# Patient Record
Sex: FEMALE | Race: WHITE | NOT HISPANIC OR LATINO | Employment: OTHER | URBAN - METROPOLITAN AREA
[De-identification: names, ages, dates, MRNs, and addresses within clinical notes are randomized per-mention and may not be internally consistent; named-entity substitution may affect disease eponyms.]

---

## 2017-01-10 RX ORDER — SIMVASTATIN 40 MG
TABLET ORAL
Qty: 90 TABLET | Refills: 0 | Status: SHIPPED | OUTPATIENT
Start: 2017-01-10 | End: 2017-03-23 | Stop reason: SDUPTHER

## 2017-01-12 RX ORDER — DICYCLOMINE HYDROCHLORIDE 10 MG/1
CAPSULE ORAL
Qty: 120 CAPSULE | Refills: 0 | Status: SHIPPED | OUTPATIENT
Start: 2017-01-12 | End: 2017-02-18 | Stop reason: SDUPTHER

## 2017-02-03 ENCOUNTER — TELEPHONE (OUTPATIENT)
Dept: FAMILY MEDICINE CLINIC | Facility: CLINIC | Age: 67
End: 2017-02-03

## 2017-02-03 DIAGNOSIS — R92.8 ABNORMAL MAMMOGRAM OF RIGHT BREAST: Primary | ICD-10-CM

## 2017-02-03 NOTE — TELEPHONE ENCOUNTER
Liset with the [facility] [place] needs a order for a   Diagnostic Right Mammogram with U/S and biopsy if needed.  The Diagnosis is: 6 mo f/u right breast for oil cysts and masses.  This order needs to be faxed to [phone]

## 2017-02-20 RX ORDER — DICYCLOMINE HYDROCHLORIDE 10 MG/1
CAPSULE ORAL
Qty: 120 CAPSULE | Refills: 0 | Status: SHIPPED | OUTPATIENT
Start: 2017-02-20 | End: 2017-03-23 | Stop reason: SDUPTHER

## 2017-03-06 RX ORDER — VENLAFAXINE HYDROCHLORIDE 150 MG/1
CAPSULE, EXTENDED RELEASE ORAL
Qty: 90 CAPSULE | Refills: 0 | Status: SHIPPED | OUTPATIENT
Start: 2017-03-06 | End: 2017-03-23 | Stop reason: SDUPTHER

## 2017-03-07 DIAGNOSIS — E78.5 HYPERLIPIDEMIA, UNSPECIFIED HYPERLIPIDEMIA TYPE: Primary | ICD-10-CM

## 2017-03-07 DIAGNOSIS — K21.9 GASTROESOPHAGEAL REFLUX DISEASE WITHOUT ESOPHAGITIS: ICD-10-CM

## 2017-03-08 ENCOUNTER — LAB (OUTPATIENT)
Dept: FAMILY MEDICINE CLINIC | Facility: CLINIC | Age: 67
End: 2017-03-08

## 2017-03-08 DIAGNOSIS — E78.5 HYPERLIPIDEMIA, UNSPECIFIED HYPERLIPIDEMIA TYPE: ICD-10-CM

## 2017-03-08 DIAGNOSIS — K21.9 GASTROESOPHAGEAL REFLUX DISEASE WITHOUT ESOPHAGITIS: ICD-10-CM

## 2017-03-08 LAB
ALBUMIN SERPL-MCNC: 4.2 G/DL (ref 3.5–5.2)
ALBUMIN/GLOB SERPL: 1.5 G/DL
ALP SERPL-CCNC: 99 U/L (ref 40–129)
ALT SERPL-CCNC: 39 U/L (ref 5–33)
AST SERPL-CCNC: 33 U/L (ref 5–32)
BASOPHILS # BLD AUTO: 0.04 10*3/MM3 (ref 0–0.2)
BASOPHILS NFR BLD AUTO: 0.6 % (ref 0–2)
BILIRUB SERPL-MCNC: 0.4 MG/DL (ref 0.2–1.2)
BUN SERPL-MCNC: 12 MG/DL (ref 8–23)
BUN/CREAT SERPL: 14.3 (ref 7–25)
CALCIUM SERPL-MCNC: 9.4 MG/DL (ref 8.8–10.5)
CHLORIDE SERPL-SCNC: 99 MMOL/L (ref 98–107)
CHOLEST SERPL-MCNC: 194 MG/DL (ref 0–200)
CO2 SERPL-SCNC: 31.8 MMOL/L (ref 22–29)
CREAT SERPL-MCNC: 0.84 MG/DL (ref 0.57–1)
EOSINOPHIL # BLD AUTO: 0.2 10*3/MM3 (ref 0.1–0.3)
EOSINOPHIL NFR BLD AUTO: 3 % (ref 0–4)
ERYTHROCYTE [DISTWIDTH] IN BLOOD BY AUTOMATED COUNT: 11.9 % (ref 11.5–14.5)
GLOBULIN SER CALC-MCNC: 2.8 GM/DL
GLUCOSE SERPL-MCNC: 97 MG/DL (ref 65–99)
HCT VFR BLD AUTO: 44.1 % (ref 37–47)
HDLC SERPL-MCNC: 62 MG/DL (ref 40–60)
HGB BLD-MCNC: 14 G/DL (ref 12–16)
IMM GRANULOCYTES # BLD: 0.03 10*3/MM3 (ref 0–0.03)
IMM GRANULOCYTES NFR BLD: 0.5 % (ref 0–0.5)
LDLC SERPL CALC-MCNC: 96 MG/DL (ref 0–100)
LYMPHOCYTES # BLD AUTO: 1.39 10*3/MM3 (ref 0.6–4.8)
LYMPHOCYTES NFR BLD AUTO: 21.2 % (ref 20–45)
MCH RBC QN AUTO: 30.2 PG (ref 27–31)
MCHC RBC AUTO-ENTMCNC: 31.7 G/DL (ref 31–37)
MCV RBC AUTO: 95.2 FL (ref 81–99)
MONOCYTES # BLD AUTO: 0.58 10*3/MM3 (ref 0–1)
MONOCYTES NFR BLD AUTO: 8.8 % (ref 3–8)
NEUTROPHILS # BLD AUTO: 4.32 10*3/MM3 (ref 1.5–8.3)
NEUTROPHILS NFR BLD AUTO: 65.9 % (ref 45–70)
NRBC BLD AUTO-RTO: 0 /100 WBC (ref 0–0)
PLATELET # BLD AUTO: 220 10*3/MM3 (ref 140–500)
POTASSIUM SERPL-SCNC: 4.8 MMOL/L (ref 3.5–5.2)
PROT SERPL-MCNC: 7 G/DL (ref 6–8.5)
RBC # BLD AUTO: 4.63 10*6/MM3 (ref 4.2–5.4)
SODIUM SERPL-SCNC: 141 MMOL/L (ref 136–145)
TRIGL SERPL-MCNC: 180 MG/DL (ref 0–150)
TSH SERPL DL<=0.005 MIU/L-ACNC: 1.37 MIU/ML (ref 0.27–4.2)
VLDLC SERPL CALC-MCNC: 36 MG/DL (ref 7–27)
WBC # BLD AUTO: 6.56 10*3/MM3 (ref 4.8–10.8)

## 2017-03-16 RX ORDER — TRAZODONE HYDROCHLORIDE 100 MG/1
TABLET ORAL
Qty: 180 TABLET | Refills: 0 | Status: SHIPPED | OUTPATIENT
Start: 2017-03-16 | End: 2017-03-23 | Stop reason: SDUPTHER

## 2017-03-21 ENCOUNTER — TELEPHONE (OUTPATIENT)
Dept: FAMILY MEDICINE CLINIC | Facility: CLINIC | Age: 67
End: 2017-03-21

## 2017-03-21 NOTE — TELEPHONE ENCOUNTER
----- Message from Jaelyn Pineda MD sent at 3/20/2017  8:33 PM EDT -----  No new findings on mammo

## 2017-03-23 ENCOUNTER — OFFICE VISIT (OUTPATIENT)
Dept: FAMILY MEDICINE CLINIC | Facility: CLINIC | Age: 67
End: 2017-03-23

## 2017-03-23 VITALS
HEART RATE: 88 BPM | RESPIRATION RATE: 16 BRPM | SYSTOLIC BLOOD PRESSURE: 120 MMHG | TEMPERATURE: 98.2 F | BODY MASS INDEX: 31.55 KG/M2 | DIASTOLIC BLOOD PRESSURE: 82 MMHG | WEIGHT: 201 LBS | HEIGHT: 67 IN | OXYGEN SATURATION: 91 %

## 2017-03-23 DIAGNOSIS — M54.41 CHRONIC BILATERAL LOW BACK PAIN WITH BILATERAL SCIATICA: ICD-10-CM

## 2017-03-23 DIAGNOSIS — F51.01 PRIMARY INSOMNIA: ICD-10-CM

## 2017-03-23 DIAGNOSIS — E78.2 MIXED HYPERLIPIDEMIA: Primary | ICD-10-CM

## 2017-03-23 DIAGNOSIS — M54.42 CHRONIC BILATERAL LOW BACK PAIN WITH BILATERAL SCIATICA: ICD-10-CM

## 2017-03-23 DIAGNOSIS — G89.29 CHRONIC BILATERAL LOW BACK PAIN WITH BILATERAL SCIATICA: ICD-10-CM

## 2017-03-23 DIAGNOSIS — R11.0 NAUSEA: ICD-10-CM

## 2017-03-23 DIAGNOSIS — Z11.59 NEED FOR HEPATITIS C SCREENING TEST: ICD-10-CM

## 2017-03-23 DIAGNOSIS — F33.41 RECURRENT MAJOR DEPRESSIVE DISORDER, IN PARTIAL REMISSION (HCC): ICD-10-CM

## 2017-03-23 DIAGNOSIS — K21.00 GASTROESOPHAGEAL REFLUX DISEASE WITH ESOPHAGITIS: ICD-10-CM

## 2017-03-23 PROCEDURE — 99214 OFFICE O/P EST MOD 30 MIN: CPT | Performed by: FAMILY MEDICINE

## 2017-03-23 RX ORDER — ATENOLOL 25 MG/1
12.5 TABLET ORAL DAILY
Qty: 90 TABLET | Refills: 0 | Status: SHIPPED | OUTPATIENT
Start: 2017-03-23 | End: 2017-08-29

## 2017-03-23 RX ORDER — SIMVASTATIN 40 MG
40 TABLET ORAL NIGHTLY
Qty: 90 TABLET | Refills: 1 | Status: SHIPPED | OUTPATIENT
Start: 2017-03-23 | End: 2017-08-29

## 2017-03-23 RX ORDER — RANITIDINE 150 MG/1
150 CAPSULE ORAL 2 TIMES DAILY
Qty: 60 CAPSULE | Refills: 11 | Status: SHIPPED | OUTPATIENT
Start: 2017-03-23 | End: 2017-09-26

## 2017-03-23 RX ORDER — DICYCLOMINE HYDROCHLORIDE 10 MG/1
10 CAPSULE ORAL 2 TIMES DAILY
Qty: 180 CAPSULE | Refills: 3 | Status: SHIPPED | OUTPATIENT
Start: 2017-03-23 | End: 2018-03-27 | Stop reason: SDUPTHER

## 2017-03-23 RX ORDER — VENLAFAXINE HYDROCHLORIDE 150 MG/1
150 CAPSULE, EXTENDED RELEASE ORAL DAILY
Qty: 90 CAPSULE | Refills: 3 | Status: SHIPPED | OUTPATIENT
Start: 2017-03-23 | End: 2017-08-29

## 2017-03-23 RX ORDER — METAXALONE 800 MG/1
800 TABLET ORAL 3 TIMES DAILY PRN
Qty: 90 TABLET | Refills: 6 | Status: SHIPPED | OUTPATIENT
Start: 2017-03-23 | End: 2017-08-29

## 2017-03-23 RX ORDER — ONDANSETRON 4 MG/1
4 TABLET, FILM COATED ORAL EVERY 8 HOURS PRN
Qty: 30 TABLET | Refills: 2 | Status: SHIPPED | OUTPATIENT
Start: 2017-03-23 | End: 2018-02-08 | Stop reason: SDUPTHER

## 2017-03-23 RX ORDER — TRAZODONE HYDROCHLORIDE 100 MG/1
300 TABLET ORAL NIGHTLY
Qty: 180 TABLET | Refills: 6 | Status: SHIPPED | OUTPATIENT
Start: 2017-03-23 | End: 2017-08-29

## 2017-03-23 NOTE — PROGRESS NOTES
Subjective   Kate Brambila is a 66 y.o. female.   She  is here today to f/u on   Chief Complaint   Patient presents with   • Hyperlipidemia   • Back Pain   .     History of Present Illness   Ms. Brambila is here to follow-up on hyperlipidemia, reflux, depression, insomnia, nausea and chronic low back pain.  She has chronic lumbar pain for the past approximately 20 years.  She had 2 discogenic surgeries in the late 1990s.  She is managed her episodic flareups with Robaxin for the past several years however now insurance will no longer cover this.  Currently on Protonix for reflux however willing to try Zantac given possible adverse side effects of PPIs.  Zofran works well for her nausea.  Beginning to wake more in the middle of the night on 200 mg of trazodone.  Effexor works very well with her depression and anxiety.  The following portions of the patient's history were reviewed and updated as appropriate: current medications, past family history, past medical history, past social history, past surgical history and problem list.    Review of Systems   Gastrointestinal: Positive for nausea.   Musculoskeletal: Positive for back pain.   All other systems reviewed and are negative.      Objective    Vitals:    03/23/17 0800   BP: 120/82   Pulse: 88   Resp: 16   Temp: 98.2 °F (36.8 °C)   SpO2: 91%     BP Readings from Last 3 Encounters:   03/23/17 120/82   08/30/16 122/74   08/24/16 116/72       Physical Exam   Constitutional: She is oriented to person, place, and time. She appears well-developed and well-nourished.   HENT:   Head: Normocephalic and atraumatic.   Eyes: EOM are normal.   Neck: Normal range of motion. Neck supple. No thyromegaly present.   Cardiovascular: Normal rate, regular rhythm, normal heart sounds and intact distal pulses.    Pulmonary/Chest: Effort normal and breath sounds normal.   Musculoskeletal: She exhibits no edema.   Lymphadenopathy:     She has no cervical adenopathy.   Neurological: She  is alert and oriented to person, place, and time. No cranial nerve deficit.   Skin: Skin is warm and dry.   Psychiatric: She has a normal mood and affect. Her behavior is normal. Judgment and thought content normal.       Invalid input(s): 2W    Assessment/Plan   Kate was seen today for hyperlipidemia and back pain.    Diagnoses and all orders for this visit:    Mixed hyperlipidemia  -     Comprehensive metabolic panel; Future  -     Lipid panel; Future  -     CBC and Differential; Future  -     TSH; Future    Gastroesophageal reflux disease with esophagitis  -     Comprehensive metabolic panel; Future  -     CBC and Differential; Future    Nausea  -     Comprehensive metabolic panel; Future  -     CBC and Differential; Future    Recurrent major depressive disorder, in partial remission  -     TSH; Future    Primary insomnia  -     TSH; Future    Chronic bilateral low back pain with bilateral sciatica    Need for hepatitis C screening test  -     Hepatitis C Antibody; Future    Other orders  -     metaxalone (SKELAXIN) 800 MG tablet; Take 1 tablet by mouth 3 (Three) Times a Day As Needed for Muscle Spasms.  -     simvastatin (ZOCOR) 40 MG tablet; Take 1 tablet by mouth Every Night.  -     ondansetron (ZOFRAN) 4 MG tablet; Take 1 tablet by mouth Every 8 (Eight) Hours As Needed for Nausea or Vomiting.  -     dicyclomine (BENTYL) 10 MG capsule; Take 1 capsule by mouth 2 (Two) Times a Day.  -     atenolol (TENORMIN) 25 MG tablet; Take 0.5 tablets by mouth Daily.  -     traZODone (DESYREL) 100 MG tablet; Take 3 tablets by mouth Every Night.  -     venlafaxine XR (EFFEXOR-XR) 150 MG 24 hr capsule; Take 1 capsule by mouth Daily.  -     ranitidine (ZANTAC) 150 MG capsule; Take 1 capsule by mouth 2 (Two) Times a Day.        Patient Instructions   Is on Zocor 20 mg daily.  Increase trazodone to 300 mg daily at bedtime.  If this is not effective we'll switch agents.          No visits with results within 2 Week(s) from  this visit.  Latest known visit with results is:    Lab on 03/08/2017   Component Date Value Ref Range Status   • Glucose 03/08/2017 97  65 - 99 mg/dL Final   • BUN 03/08/2017 12  8 - 23 mg/dL Final   • Creatinine 03/08/2017 0.84  0.57 - 1.00 mg/dL Final   • eGFR Non  Am 03/08/2017 68  >60 mL/min/1.73 Final   • eGFR African Am 03/08/2017 82  >60 mL/min/1.73 Final   • BUN/Creatinine Ratio 03/08/2017 14.3  7.0 - 25.0 Final   • Sodium 03/08/2017 141  136 - 145 mmol/L Final   • Potassium 03/08/2017 4.8  3.5 - 5.2 mmol/L Final   • Chloride 03/08/2017 99  98 - 107 mmol/L Final   • Total CO2 03/08/2017 31.8* 22.0 - 29.0 mmol/L Final   • Calcium 03/08/2017 9.4  8.8 - 10.5 mg/dL Final   • Total Protein 03/08/2017 7.0  6.0 - 8.5 g/dL Final   • Albumin 03/08/2017 4.20  3.50 - 5.20 g/dL Final   • Globulin 03/08/2017 2.8  gm/dL Final   • A/G Ratio 03/08/2017 1.5  g/dL Final   • Total Bilirubin 03/08/2017 0.4  0.2 - 1.2 mg/dL Final   • Alkaline Phosphatase 03/08/2017 99  40 - 129 U/L Final   • AST (SGOT) 03/08/2017 33* 5 - 32 U/L Final   • ALT (SGPT) 03/08/2017 39* 5 - 33 U/L Final   • Total Cholesterol 03/08/2017 194  0 - 200 mg/dL Final   • Triglycerides 03/08/2017 180* 0 - 150 mg/dL Final   • HDL Cholesterol 03/08/2017 62* 40 - 60 mg/dL Final   • VLDL Cholesterol 03/08/2017 36* 7 - 27 mg/dL Final   • LDL Cholesterol  03/08/2017 96  0 - 100 mg/dL Final   • TSH 03/08/2017 1.370  0.270 - 4.200 mIU/mL Final   • WBC 03/08/2017 6.56  4.80 - 10.80 10*3/mm3 Final   • RBC 03/08/2017 4.63  4.20 - 5.40 10*6/mm3 Final   • Hemoglobin 03/08/2017 14.0  12.0 - 16.0 g/dL Final   • Hematocrit 03/08/2017 44.1  37.0 - 47.0 % Final   • MCV 03/08/2017 95.2  81.0 - 99.0 fL Final   • MCH 03/08/2017 30.2  27.0 - 31.0 pg Final   • MCHC 03/08/2017 31.7  31.0 - 37.0 g/dL Final   • RDW 03/08/2017 11.9  11.5 - 14.5 % Final   • Platelets 03/08/2017 220  140 - 500 10*3/mm3 Final   • Neutrophil Rel % 03/08/2017 65.9  45.0 - 70.0 % Final   • Lymphocyte  Rel % 03/08/2017 21.2  20.0 - 45.0 % Final   • Monocyte Rel % 03/08/2017 8.8* 3.0 - 8.0 % Final   • Eosinophil Rel % 03/08/2017 3.0  0.0 - 4.0 % Final   • Basophil Rel % 03/08/2017 0.6  0.0 - 2.0 % Final   • Neutrophils Absolute 03/08/2017 4.32  1.50 - 8.30 10*3/mm3 Final   • Lymphocytes Absolute 03/08/2017 1.39  0.60 - 4.80 10*3/mm3 Final   • Monocytes Absolute 03/08/2017 0.58  0.00 - 1.00 10*3/mm3 Final   • Eosinophils Absolute 03/08/2017 0.20  0.10 - 0.30 10*3/mm3 Final   • Basophils Absolute 03/08/2017 0.04  0.00 - 0.20 10*3/mm3 Final   • Immature Granulocyte Rel % 03/08/2017 0.5  0.0 - 0.5 % Final   • Immature Grans Absolute 03/08/2017 0.03  0.00 - 0.03 10*3/mm3 Final   • nRBC 03/08/2017 0.0  0.0 - 0.0 /100 WBC Final

## 2017-03-23 NOTE — PATIENT INSTRUCTIONS
Is on Zocor 20 mg daily.  Increase trazodone to 300 mg daily at bedtime.  If this is not effective we'll switch agents.

## 2017-05-18 ENCOUNTER — OFFICE VISIT (OUTPATIENT)
Dept: FAMILY MEDICINE CLINIC | Facility: CLINIC | Age: 67
End: 2017-05-18

## 2017-05-18 VITALS
DIASTOLIC BLOOD PRESSURE: 68 MMHG | RESPIRATION RATE: 16 BRPM | OXYGEN SATURATION: 92 % | SYSTOLIC BLOOD PRESSURE: 114 MMHG | HEART RATE: 77 BPM | HEIGHT: 67 IN

## 2017-05-18 DIAGNOSIS — N30.01 ACUTE CYSTITIS WITH HEMATURIA: ICD-10-CM

## 2017-05-18 DIAGNOSIS — R39.15 URGENCY OF URINATION: Primary | ICD-10-CM

## 2017-05-18 LAB
BILIRUB BLD-MCNC: ABNORMAL MG/DL
CLARITY, POC: ABNORMAL
COLOR UR: ABNORMAL
GLUCOSE UR STRIP-MCNC: NEGATIVE MG/DL
KETONES UR QL: NEGATIVE
LEUKOCYTE EST, POC: ABNORMAL
NITRITE UR-MCNC: POSITIVE MG/ML
PH UR: 6 [PH] (ref 5–8)
PROT UR STRIP-MCNC: NEGATIVE MG/DL
RBC # UR STRIP: ABNORMAL /UL
SP GR UR: 1 (ref 1–1.03)
UROBILINOGEN UR QL: NORMAL

## 2017-05-18 PROCEDURE — 81002 URINALYSIS NONAUTO W/O SCOPE: CPT | Performed by: NURSE PRACTITIONER

## 2017-05-18 PROCEDURE — 99212 OFFICE O/P EST SF 10 MIN: CPT | Performed by: NURSE PRACTITIONER

## 2017-05-18 RX ORDER — NITROFURANTOIN 25; 75 MG/1; MG/1
100 CAPSULE ORAL 2 TIMES DAILY
Qty: 14 CAPSULE | Refills: 0 | Status: SHIPPED | OUTPATIENT
Start: 2017-05-18 | End: 2017-05-25

## 2017-06-05 RX ORDER — VENLAFAXINE HYDROCHLORIDE 150 MG/1
CAPSULE, EXTENDED RELEASE ORAL
Qty: 90 CAPSULE | Refills: 0 | Status: SHIPPED | OUTPATIENT
Start: 2017-06-05 | End: 2017-09-26 | Stop reason: SDUPTHER

## 2017-06-13 RX ORDER — TRAZODONE HYDROCHLORIDE 100 MG/1
TABLET ORAL
Qty: 180 TABLET | Refills: 0 | Status: SHIPPED | OUTPATIENT
Start: 2017-06-13 | End: 2017-09-26 | Stop reason: SDUPTHER

## 2017-07-14 ENCOUNTER — OFFICE VISIT (OUTPATIENT)
Dept: FAMILY MEDICINE CLINIC | Facility: CLINIC | Age: 67
End: 2017-07-14

## 2017-07-14 VITALS
BODY MASS INDEX: 31.23 KG/M2 | TEMPERATURE: 98.1 F | DIASTOLIC BLOOD PRESSURE: 78 MMHG | HEART RATE: 90 BPM | HEIGHT: 67 IN | RESPIRATION RATE: 16 BRPM | WEIGHT: 199 LBS | OXYGEN SATURATION: 98 % | SYSTOLIC BLOOD PRESSURE: 110 MMHG

## 2017-07-14 DIAGNOSIS — R35.0 URINE FREQUENCY: ICD-10-CM

## 2017-07-14 DIAGNOSIS — R30.0 DYSURIA: Primary | ICD-10-CM

## 2017-07-14 DIAGNOSIS — N39.0 URINARY TRACT INFECTION WITH HEMATURIA, SITE UNSPECIFIED: ICD-10-CM

## 2017-07-14 DIAGNOSIS — E55.9 VITAMIN D INSUFFICIENCY: ICD-10-CM

## 2017-07-14 DIAGNOSIS — R53.83 OTHER FATIGUE: ICD-10-CM

## 2017-07-14 DIAGNOSIS — R31.9 URINARY TRACT INFECTION WITH HEMATURIA, SITE UNSPECIFIED: ICD-10-CM

## 2017-07-14 LAB
BILIRUB BLD-MCNC: NEGATIVE MG/DL
CLARITY, POC: ABNORMAL
COLOR UR: ABNORMAL
GLUCOSE UR STRIP-MCNC: NEGATIVE MG/DL
KETONES UR QL: NEGATIVE
LEUKOCYTE EST, POC: ABNORMAL
NITRITE UR-MCNC: POSITIVE MG/ML
PH UR: 7.5 [PH] (ref 5–8)
PROT UR STRIP-MCNC: ABNORMAL MG/DL
RBC # UR STRIP: ABNORMAL /UL
SP GR UR: 1.02 (ref 1–1.03)
UROBILINOGEN UR QL: NORMAL

## 2017-07-14 PROCEDURE — 99214 OFFICE O/P EST MOD 30 MIN: CPT | Performed by: PHYSICIAN ASSISTANT

## 2017-07-14 PROCEDURE — 81002 URINALYSIS NONAUTO W/O SCOPE: CPT | Performed by: PHYSICIAN ASSISTANT

## 2017-07-14 RX ORDER — LEVOFLOXACIN 750 MG/1
750 TABLET ORAL DAILY
Qty: 5 TABLET | Refills: 0 | Status: SHIPPED | OUTPATIENT
Start: 2017-07-14 | End: 2017-08-29

## 2017-07-14 NOTE — PROGRESS NOTES
"Subjective   Kate Brambila is a 66 y.o. female.   Chief Complaint   Patient presents with   • Urinary Tract Infection       History of Present Illness     Kate Is a 66-year-old female who presents with dysuria for the past few weeks.  States she was diagnosed with an UTI in May 2017.  She was prescribed Macrobid which she completed.  She is unsure if the medication got rid of the infections.  She has had strong odor to urine,urine urgency,urine frequency and hesitancy.  She has had low back pain which is chronic.  Denied any fevers or chills.  Appetite and sleep has been normal. Kate has been riding her bike daily.  States she has had increased periods of fatigue.  Bowel movements are daily. She has had some fatigue issues.  Has had some hair loss but does color her hair.      The following portions of the patient's history were reviewed and updated as appropriate: allergies, current medications, past family history, past medical history, past social history and past surgical history.    Review of Systems   Constitutional: Positive for fatigue. Negative for chills and fever.   HENT: Negative.    Eyes: Negative.    Respiratory: Negative.    Cardiovascular: Negative.    Gastrointestinal: Negative.    Endocrine: Negative.    Genitourinary: Positive for difficulty urinating, dysuria, frequency and urgency. Negative for hematuria, vaginal bleeding, vaginal discharge and vaginal pain.   Musculoskeletal: Positive for back pain.   Skin: Negative.    Allergic/Immunologic: Negative.    Neurological: Negative.    Hematological: Negative.    Psychiatric/Behavioral: Negative.    All other systems reviewed and are negative.    Vitals:    07/14/17 0738   BP: 110/78   BP Location: Left arm   Patient Position: Sitting   Cuff Size: Large Adult   Pulse: 90   Resp: 16   Temp: 98.1 °F (36.7 °C)   TempSrc: Oral   SpO2: 98%   Weight: 199 lb (90.3 kg)   Height: 67\" (170.2 cm)     Body mass index is 31.17 kg/(m^2).  Blood " Pressures during visit    07/14/17 0738   BP: 110/78     Wt Readings from Last 3 Encounters:   07/14/17 199 lb (90.3 kg)   03/23/17 201 lb (91.2 kg)   08/30/16 190 lb (86.2 kg)       BP Readings from Last 3 Encounters:   07/14/17 110/78   05/18/17 114/68   03/23/17 120/82     Objective   Physical Exam   Constitutional: She is oriented to person, place, and time. Vital signs are normal. She appears well-developed and well-nourished.   HENT:   Head: Normocephalic and atraumatic.   Right Ear: Hearing, tympanic membrane, external ear and ear canal normal.   Left Ear: Hearing, tympanic membrane, external ear and ear canal normal.   Nose: Nose normal. Right sinus exhibits no maxillary sinus tenderness and no frontal sinus tenderness. Left sinus exhibits no maxillary sinus tenderness and no frontal sinus tenderness.   Mouth/Throat: Uvula is midline, oropharynx is clear and moist and mucous membranes are normal.   Eyes: Conjunctivae, EOM and lids are normal. Pupils are equal, round, and reactive to light.   Neck: Trachea normal and phonation normal. Neck supple. Carotid bruit is not present. No edema present. No thyromegaly present.   Cardiovascular: Normal rate, regular rhythm, S1 normal, S2 normal, normal heart sounds and normal pulses.    Pulmonary/Chest: Effort normal and breath sounds normal.   Abdominal: Soft. Normal appearance, normal aorta and bowel sounds are normal. There is no hepatomegaly. There is no tenderness. There is CVA tenderness (slight).   Lymphadenopathy:     She has no cervical adenopathy.   Neurological: She is alert and oriented to person, place, and time.   Skin: Skin is warm, dry and intact.   Psychiatric: She has a normal mood and affect. Her speech is normal and behavior is normal. Judgment and thought content normal. Cognition and memory are normal.         Results for orders placed or performed in visit on 07/14/17   POCT urinalysis dipstick, manual   Result Value Ref Range    Color Dark  Yellow Yellow, Straw, Dark Yellow, Claudia    Clarity, UA Cloudy (A) Clear    Glucose, UA Negative Negative, 1000 mg/dL (3+) mg/dL    Bilirubin Negative Negative    Ketones, UA Negative Negative    Specific Gravity  1.020 1.005 - 1.030    Blood, UA Large (A) Negative    pH, Urine 7.5 5.0 - 8.0    Protein, POC Trace (A) Negative mg/dL    Urobilinogen, UA Normal Normal    Leukocytes Large (3+) (A) Negative    Nitrite, UA Positive (A) Negative     Assessment/Plan   Kate was seen today for urinary tract infection.    Diagnoses and all orders for this visit:    Dysuria  -     POCT urinalysis dipstick, manual  -     Urine Culture  -     levoFLOXacin (LEVAQUIN) 750 MG tablet; Take 1 tablet by mouth Daily.    Urine frequency  -     Urine Culture  -     levoFLOXacin (LEVAQUIN) 750 MG tablet; Take 1 tablet by mouth Daily.    Urinary tract infection with hematuria, site unspecified  -     Urine Culture  -     levoFLOXacin (LEVAQUIN) 750 MG tablet; Take 1 tablet by mouth Daily.    Other fatigue  -     TSH  -     Vitamin D 25 Hydroxy  -     CBC & Differential  -     Vitamin B12  -     Basic Metabolic Panel    Vitamin D insufficiency  -     Vitamin D 25 Hydroxy      1.  New onset dysuria, urine frequency with UTI: Her in office urinalysis showed red cells, white cells and nitrates.  She was diagnosed with UTI.  I have prescribed Levaquin antibiotic to pharmacy. Kate notified of test results when completed.  2.  Chronic vitamin D insufficient: She will have a vitamin D blood work collected at today's office visit.  She'll be notified of test results when completed.  3.  New onset fatigue: In addition to above blood work she will have a TSH, CBC, vitamin B12 and a BMP.  She will be notified of test results when completed.        [employer] transcription disclaimer    Much of this encounter note is an electronic transcription/translation of spoken language to printed text.  The electronic translation of spoken language may permit  erroneous, or at times, nonsensical words or phrases to be inadvertently transcribed.  Although I have reviewed the note for such errors, some may still exist.    Yisel Brooks PA-C  Family Practice

## 2017-07-17 ENCOUNTER — TELEPHONE (OUTPATIENT)
Dept: FAMILY MEDICINE CLINIC | Facility: CLINIC | Age: 67
End: 2017-07-17

## 2017-07-17 LAB
25(OH)D3+25(OH)D2 SERPL-MCNC: 27.2 NG/ML
BACTERIA UR CULT: ABNORMAL
BACTERIA UR CULT: ABNORMAL
BASOPHILS # BLD AUTO: 0.05 10*3/MM3 (ref 0–0.2)
BASOPHILS NFR BLD AUTO: 0.5 % (ref 0–2)
BUN SERPL-MCNC: 15 MG/DL (ref 8–23)
BUN/CREAT SERPL: 19.5 (ref 7–25)
CALCIUM SERPL-MCNC: 9 MG/DL (ref 8.8–10.5)
CHLORIDE SERPL-SCNC: 103 MMOL/L (ref 98–107)
CO2 SERPL-SCNC: 27.6 MMOL/L (ref 22–29)
CREAT SERPL-MCNC: 0.77 MG/DL (ref 0.57–1)
EOSINOPHIL # BLD AUTO: 0.17 10*3/MM3 (ref 0.1–0.3)
EOSINOPHIL NFR BLD AUTO: 1.7 % (ref 0–4)
ERYTHROCYTE [DISTWIDTH] IN BLOOD BY AUTOMATED COUNT: 12.7 % (ref 11.5–14.5)
GLUCOSE SERPL-MCNC: 102 MG/DL (ref 65–99)
HCT VFR BLD AUTO: 39.4 % (ref 37–47)
HGB BLD-MCNC: 12.9 G/DL (ref 12–16)
IMM GRANULOCYTES # BLD: 0.03 10*3/MM3 (ref 0–0.03)
IMM GRANULOCYTES NFR BLD: 0.3 % (ref 0–0.5)
LYMPHOCYTES # BLD AUTO: 1.55 10*3/MM3 (ref 0.6–4.8)
LYMPHOCYTES NFR BLD AUTO: 15.8 % (ref 20–45)
MCH RBC QN AUTO: 30.4 PG (ref 27–31)
MCHC RBC AUTO-ENTMCNC: 32.7 G/DL (ref 31–37)
MCV RBC AUTO: 92.7 FL (ref 81–99)
MONOCYTES # BLD AUTO: 0.78 10*3/MM3 (ref 0–1)
MONOCYTES NFR BLD AUTO: 8 % (ref 3–8)
NEUTROPHILS # BLD AUTO: 7.2 10*3/MM3 (ref 1.5–8.3)
NEUTROPHILS NFR BLD AUTO: 73.7 % (ref 45–70)
NRBC BLD AUTO-RTO: 0 /100 WBC (ref 0–0)
OTHER ANTIBIOTIC SUSC ISLT: ABNORMAL
PLATELET # BLD AUTO: 216 10*3/MM3 (ref 140–500)
POTASSIUM SERPL-SCNC: 4.5 MMOL/L (ref 3.5–5.2)
RBC # BLD AUTO: 4.25 10*6/MM3 (ref 4.2–5.4)
SODIUM SERPL-SCNC: 143 MMOL/L (ref 136–145)
TSH SERPL DL<=0.005 MIU/L-ACNC: 1.52 MIU/ML (ref 0.27–4.2)
VIT B12 SERPL-MCNC: 536 PG/ML (ref 211–946)
WBC # BLD AUTO: 9.78 10*3/MM3 (ref 4.8–10.8)

## 2017-07-17 NOTE — TELEPHONE ENCOUNTER
----- Message from Yisel Brooks PA-C sent at 7/17/2017  4:58 PM EDT -----  1.  notify patient that urine culture was positive for Escherichia coli.  Please finish Levaquin antibiotic which is a good antibiotic.  Plan to repeat urine culture after completion of antibiotic course.  2.  TSH was normal.  3.  Vitamin D level was slightly decreased at 27.2.  This has improved from 7 months ago.  Take over-the-counter vitamin D3 2000 international units daily.  Plan to repeat vitamin D level in 6 months.  4.  CBC was normal.

## 2017-07-21 DIAGNOSIS — R31.9 URINARY TRACT INFECTION WITH HEMATURIA, SITE UNSPECIFIED: Primary | ICD-10-CM

## 2017-07-21 DIAGNOSIS — N39.0 URINARY TRACT INFECTION WITH HEMATURIA, SITE UNSPECIFIED: Primary | ICD-10-CM

## 2017-07-23 LAB
BACTERIA UR CULT: NORMAL
BACTERIA UR CULT: NORMAL

## 2017-07-24 ENCOUNTER — TELEPHONE (OUTPATIENT)
Dept: FAMILY MEDICINE CLINIC | Facility: CLINIC | Age: 67
End: 2017-07-24

## 2017-07-24 NOTE — TELEPHONE ENCOUNTER
----- Message from Yisel Brooks PA-C sent at 7/24/2017  6:55 AM EDT -----  Notify patient that urine culture showed mixed organisms.  Finish antibiotic.

## 2017-08-29 ENCOUNTER — OFFICE VISIT (OUTPATIENT)
Dept: CARDIOLOGY | Facility: CLINIC | Age: 67
End: 2017-08-29

## 2017-08-29 VITALS
RESPIRATION RATE: 16 BRPM | WEIGHT: 199 LBS | SYSTOLIC BLOOD PRESSURE: 132 MMHG | DIASTOLIC BLOOD PRESSURE: 78 MMHG | HEIGHT: 67 IN | HEART RATE: 68 BPM | BODY MASS INDEX: 31.23 KG/M2

## 2017-08-29 DIAGNOSIS — I36.1 NON-RHEUMATIC TRICUSPID VALVE INSUFFICIENCY: ICD-10-CM

## 2017-08-29 DIAGNOSIS — I34.0 NON-RHEUMATIC MITRAL REGURGITATION: ICD-10-CM

## 2017-08-29 DIAGNOSIS — E78.2 MIXED HYPERLIPIDEMIA: ICD-10-CM

## 2017-08-29 DIAGNOSIS — I47.1 SUPRAVENTRICULAR TACHYCARDIA (HCC): Primary | ICD-10-CM

## 2017-08-29 PROCEDURE — 99214 OFFICE O/P EST MOD 30 MIN: CPT | Performed by: INTERNAL MEDICINE

## 2017-08-29 PROCEDURE — 93000 ELECTROCARDIOGRAM COMPLETE: CPT | Performed by: INTERNAL MEDICINE

## 2017-08-29 RX ORDER — ACETAMINOPHEN 160 MG
2000 TABLET,DISINTEGRATING ORAL DAILY
COMMUNITY
End: 2019-07-25 | Stop reason: ALTCHOICE

## 2017-08-29 RX ORDER — SIMVASTATIN 20 MG
20 TABLET ORAL NIGHTLY
COMMUNITY
End: 2017-09-26 | Stop reason: SDUPTHER

## 2017-08-29 RX ORDER — ATENOLOL 25 MG/1
25 TABLET ORAL DAILY
Qty: 90 TABLET | Refills: 3 | Status: SHIPPED | OUTPATIENT
Start: 2017-08-29 | End: 2018-08-09 | Stop reason: SDUPTHER

## 2017-08-29 NOTE — PROGRESS NOTES
Date of Office Visit: 2017  Encounter Provider: Sherri Robison MD  Place of Service: [facility]  Patient Name: Kate Brambila  :1950      Patient ID:  Kate Brambila is a 66 y.o. female is here for  followup for SVT, MR and TR.         History of Present Illness    She had a PET stress study done in  because of some chest pain and had a Holter at  that time for palpitations. This did not show any significant abnormality other than  short runs of nonsustained supraventricular tachycardia. She has known asthma and follows  with Dr. Crowe. She had a severe bronchial infection in 2011 and wound up on a  ventilator for quite some time.       I saw her on 2012, in the chest pain unit. She came in with chest discomfort  which sounded anginal in nature and had a stress echocardiogram done on 2012,  showing no evidence of ischemia, ejection fraction of 64%, grade 1 diastolic dysfunction,  mild mitral insufficiency, and moderate tricuspid insufficiency.     She did have a 2-D echocardiogram  with Doppler done in 2014 that showed ejection fraction of 60%, grade 1 diastolic  dysfunction, mild mitral insufficiency, mild to moderate tricuspid insufficiency, right  ventricular systolic pressure of 24 mmHg, and no pericardial effusion.      She did have a carotid duplex study performed in 2015 which revealed mild bilateral carotid plaquing.       Mrs. Brambila is doing well this time.  She was having episodes of dizziness in the past which we thought was due to low blood pressure but it's really coming from her back problems.  She's noticed more tachycardia in the past 3 months.  The episodes last 15-45 seconds.  She has no dizziness or syncope associated with it.  She has no chest pain.  She's had no difficulty breathing.  She's had no orthopnea or PND.  There is no particular pattern to when the tachycardia starts.  She  wonders at this point if she needs more atenolol.    Past Medical History:   Diagnosis Date   • Abdominal bloating    • Abnormal stool color    • Acid reflux    • Acid reflux    • Acute bronchitis    • Acute bronchitis    • Asthma with allergic rhinitis    • Asthma with allergic rhinitis    • Cataract extraction status of left eye    • Chest pain    • Colon cancer screening    • Cough    • Depression    • Depression with anxiety    • Depression with anxiety    • Dysuria    • Elevated alkaline phosphatase measurement    • Esophageal reflux    • Fatigue    • Fatty stool    • Fecal soiling    • Fever    • Glaucoma    • Glaucoma    • H/O echocardiogram 09/15/2014   • H/O supraventricular tachycardia    • Health care maintenance    • History of EKG 03/10/2015   • History of Holter monitoring 08/17/2012   • Hyperlipidemia    • Insomnia    • Loose stools    • Low back pain radiating to left leg    • Mitral valve insufficiency    • Nausea    • Nephrolithiasis    • Obesity    • Obesity    • Osteoarthritis    • Pneumonia    • RAD (reactive airway disease)    • Sciatica    • Sleep apnea    • Supraventricular tachycardia    • Tricuspid valve insufficiency          Past Surgical History:   Procedure Laterality Date   • APPENDECTOMY     • BACK SURGERY     • BLADDER SURGERY     • CATARACT EXTRACTION     • COLONOSCOPY  10/08/2015   • HYSTERECTOMY     • REPLACEMENT TOTAL KNEE Right    • ROTATOR CUFF REPAIR     • TOTAL SHOULDER ARTHROPLASTY Left        Current Outpatient Prescriptions on File Prior to Visit   Medication Sig Dispense Refill   • albuterol (PROAIR HFA) 108 (90 BASE) MCG/ACT inhaler Inhale 2 puffs every 4 (four) hours as needed for wheezing. 8.5 g 6   • atenolol (TENORMIN) 25 MG tablet Take 0.5 tablets by mouth Daily. 90 tablet 0   • dicyclomine (BENTYL) 10 MG capsule Take 1 capsule by mouth 2 (Two) Times a Day. 180 capsule 3   • ondansetron (ZOFRAN) 4 MG tablet Take 1 tablet by mouth Every 8 (Eight) Hours As Needed for  Nausea or Vomiting. 30 tablet 2   • ranitidine (ZANTAC) 150 MG capsule Take 1 capsule by mouth 2 (Two) Times a Day. 60 capsule 11   • traZODone (DESYREL) 100 MG tablet TAKE 2 TABLETS BY MOUTH EVERY NIGHT AT BEDTIME 180 tablet 0   • venlafaxine XR (EFFEXOR-XR) 150 MG 24 hr capsule TAKE 1 CAPSULE BY MOUTH EVERY DAY 90 capsule 0   • [DISCONTINUED] fluticasone (VERAMYST) 27.5 MCG/SPRAY nasal spray 2 sprays into each nostril daily.     • [DISCONTINUED] fluticasone-salmeterol (ADVAIR DISKUS) 500-50 MCG/DOSE DISKUS Inhale 1 puff 2 (Two) Times a Day. 60 each 3   • [DISCONTINUED] gabapentin (NEURONTIN) 300 MG capsule TAKE 1 CAPSULE BY MOUTH THREE TIMES DAILY 90 capsule 0   • [DISCONTINUED] levoFLOXacin (LEVAQUIN) 750 MG tablet Take 1 tablet by mouth Daily. 5 tablet 0   • [DISCONTINUED] metaxalone (SKELAXIN) 800 MG tablet Take 1 tablet by mouth 3 (Three) Times a Day As Needed for Muscle Spasms. 90 tablet 6   • [DISCONTINUED] simvastatin (ZOCOR) 40 MG tablet Take 1 tablet by mouth Every Night. 90 tablet 1   • [DISCONTINUED] traZODone (DESYREL) 100 MG tablet Take 3 tablets by mouth Every Night. 180 tablet 6   • [DISCONTINUED] venlafaxine XR (EFFEXOR-XR) 150 MG 24 hr capsule Take 1 capsule by mouth Daily. 90 capsule 3     No current facility-administered medications on file prior to visit.        Social History     Social History   • Marital status:      Spouse name: N/A   • Number of children: N/A   • Years of education: N/A     Occupational History   • Not on file.     Social History Main Topics   • Smoking status: Former Smoker   • Smokeless tobacco: Never Used      Comment: caffeine use   • Alcohol use Yes      Comment: social use   • Drug use: No   • Sexual activity: Not on file     Other Topics Concern   • Not on file     Social History Narrative           Review of Systems   Constitution: Negative.   HENT: Negative for congestion and headaches.    Eyes: Negative for vision loss in left eye and vision loss in right  "eye.   Respiratory: Negative.  Negative for cough, hemoptysis, shortness of breath, sleep disturbances due to breathing, snoring, sputum production and wheezing.    Endocrine: Negative.    Hematologic/Lymphatic: Negative.    Skin: Negative for poor wound healing and rash.   Musculoskeletal: Negative for falls, gout, muscle cramps and myalgias.   Gastrointestinal: Positive for nausea. Negative for abdominal pain, diarrhea, dysphagia, hematemesis, melena and vomiting.   Neurological: Negative for excessive daytime sleepiness, dizziness, light-headedness, loss of balance, seizures and vertigo.   Psychiatric/Behavioral: Negative for depression and substance abuse. The patient is not nervous/anxious.        Procedures    ECG 12 Lead  Date/Time: 8/29/2017 12:17 PM  Performed by: LUZ PABLO  Authorized by: LUZ PABLO   Comparison: compared with previous ECG   Similar to previous ECG  Rhythm: sinus rhythm  Conduction: incomplete RBBB  T flattening: V3, V4, V5, V6, II, III and aVF  Clinical impression: abnormal ECG               Objective:      Vitals:    08/29/17 1208   BP: 132/78   Pulse: 68   Resp: 16   Weight: 199 lb (90.3 kg)   Height: 67\" (170.2 cm)     Body mass index is 31.17 kg/(m^2).    Physical Exam   Constitutional: She is oriented to person, place, and time. She appears well-developed and well-nourished. No distress.   HENT:   Head: Normocephalic and atraumatic.   Eyes: Conjunctivae are normal. No scleral icterus.   Neck: Neck supple. No JVD present. Carotid bruit is not present. No thyromegaly present.   Cardiovascular: Normal rate, regular rhythm, S1 normal, S2 normal, normal heart sounds and intact distal pulses.   No extrasystoles are present. PMI is not displaced.  Exam reveals no gallop.    No murmur heard.  Pulses:       Carotid pulses are 2+ on the right side, and 2+ on the left side.       Radial pulses are 2+ on the right side, and 2+ on the left side.        Dorsalis pedis pulses " are 2+ on the right side, and 2+ on the left side.        Posterior tibial pulses are 2+ on the right side, and 2+ on the left side.   Pulmonary/Chest: Effort normal and breath sounds normal. No respiratory distress. She has no wheezes. She has no rhonchi. She has no rales. She exhibits no tenderness.   Abdominal: Soft. Bowel sounds are normal. She exhibits no distension, no abdominal bruit and no mass. There is no tenderness.   Musculoskeletal: She exhibits no edema or deformity.   Lymphadenopathy:     She has no cervical adenopathy.   Neurological: She is alert and oriented to person, place, and time. No cranial nerve deficit.   Skin: Skin is warm and dry. No rash noted. She is not diaphoretic. No cyanosis. No pallor. Nails show no clubbing.   Psychiatric: She has a normal mood and affect. Judgment normal.   Vitals reviewed.      Lab Review:       Assessment:      Diagnosis Plan   1. Supraventricular tachycardia     2. Non-rheumatic tricuspid valve insufficiency     3. Non-rheumatic mitral regurgitation     4. Mixed hyperlipidemia       1. Normal stress echocardiogram done August 2012 for chest pain.  2. Moderate tricuspid insufficiency.   3. Moderate mitral insufficiency.  4. Hyperlipidemia, treated.  5. History of palpitations, on atenolol.  6. History of nonsustained supraventricular tachycardia. Has had more over the past 3 months.   7. BP controlled, dizziness in past due to back problems, not low BP.      Plan:       See veronica dean in 1 year.  Increase atenolol to 25mg nightly for an increase in tachycardia.

## 2017-09-14 ENCOUNTER — TELEPHONE (OUTPATIENT)
Dept: FAMILY MEDICINE CLINIC | Facility: CLINIC | Age: 67
End: 2017-09-14

## 2017-09-14 DIAGNOSIS — K21.00 GASTROESOPHAGEAL REFLUX DISEASE WITH ESOPHAGITIS: ICD-10-CM

## 2017-09-14 DIAGNOSIS — R11.0 NAUSEA: ICD-10-CM

## 2017-09-14 DIAGNOSIS — Z11.59 NEED FOR HEPATITIS C SCREENING TEST: ICD-10-CM

## 2017-09-14 DIAGNOSIS — F51.01 PRIMARY INSOMNIA: ICD-10-CM

## 2017-09-14 DIAGNOSIS — F33.41 RECURRENT MAJOR DEPRESSIVE DISORDER, IN PARTIAL REMISSION (HCC): ICD-10-CM

## 2017-09-14 DIAGNOSIS — E78.2 MIXED HYPERLIPIDEMIA: ICD-10-CM

## 2017-09-14 NOTE — TELEPHONE ENCOUNTER
----- Message from Jaelyn Pineda MD sent at 9/14/2017  9:45 AM EDT -----  Normal mammogram    ADVISED PT

## 2017-09-26 ENCOUNTER — OFFICE VISIT (OUTPATIENT)
Dept: FAMILY MEDICINE CLINIC | Facility: CLINIC | Age: 67
End: 2017-09-26

## 2017-09-26 VITALS
HEIGHT: 67 IN | OXYGEN SATURATION: 96 % | DIASTOLIC BLOOD PRESSURE: 76 MMHG | WEIGHT: 199 LBS | BODY MASS INDEX: 31.23 KG/M2 | HEART RATE: 78 BPM | TEMPERATURE: 98.2 F | SYSTOLIC BLOOD PRESSURE: 110 MMHG | RESPIRATION RATE: 16 BRPM

## 2017-09-26 DIAGNOSIS — F51.01 PRIMARY INSOMNIA: ICD-10-CM

## 2017-09-26 DIAGNOSIS — E78.2 MIXED HYPERLIPIDEMIA: ICD-10-CM

## 2017-09-26 DIAGNOSIS — G89.29 CHRONIC BILATERAL LOW BACK PAIN WITH BILATERAL SCIATICA: ICD-10-CM

## 2017-09-26 DIAGNOSIS — M54.42 CHRONIC BILATERAL LOW BACK PAIN WITH BILATERAL SCIATICA: ICD-10-CM

## 2017-09-26 DIAGNOSIS — Z23 IMMUNIZATION DUE: ICD-10-CM

## 2017-09-26 DIAGNOSIS — F33.41 RECURRENT MAJOR DEPRESSIVE DISORDER, IN PARTIAL REMISSION (HCC): ICD-10-CM

## 2017-09-26 DIAGNOSIS — Z00.00 MEDICARE ANNUAL WELLNESS VISIT, SUBSEQUENT: Primary | ICD-10-CM

## 2017-09-26 DIAGNOSIS — M54.41 CHRONIC BILATERAL LOW BACK PAIN WITH BILATERAL SCIATICA: ICD-10-CM

## 2017-09-26 DIAGNOSIS — K21.00 GASTROESOPHAGEAL REFLUX DISEASE WITH ESOPHAGITIS: ICD-10-CM

## 2017-09-26 PROCEDURE — G0439 PPPS, SUBSEQ VISIT: HCPCS | Performed by: FAMILY MEDICINE

## 2017-09-26 PROCEDURE — 90686 IIV4 VACC NO PRSV 0.5 ML IM: CPT | Performed by: FAMILY MEDICINE

## 2017-09-26 PROCEDURE — G0008 ADMIN INFLUENZA VIRUS VAC: HCPCS | Performed by: FAMILY MEDICINE

## 2017-09-26 PROCEDURE — 99214 OFFICE O/P EST MOD 30 MIN: CPT | Performed by: FAMILY MEDICINE

## 2017-09-26 PROCEDURE — G0009 ADMIN PNEUMOCOCCAL VACCINE: HCPCS | Performed by: FAMILY MEDICINE

## 2017-09-26 PROCEDURE — 90732 PPSV23 VACC 2 YRS+ SUBQ/IM: CPT | Performed by: FAMILY MEDICINE

## 2017-09-26 RX ORDER — BACLOFEN 10 MG/1
10 TABLET ORAL 2 TIMES DAILY
Qty: 60 TABLET | Refills: 0 | Status: SHIPPED | OUTPATIENT
Start: 2017-09-26 | End: 2017-10-27 | Stop reason: SDUPTHER

## 2017-09-26 RX ORDER — TRAZODONE HYDROCHLORIDE 100 MG/1
200 TABLET ORAL NIGHTLY
Qty: 180 TABLET | Refills: 2 | Status: SHIPPED | OUTPATIENT
Start: 2017-09-26 | End: 2018-03-27 | Stop reason: SDUPTHER

## 2017-09-26 RX ORDER — VENLAFAXINE HYDROCHLORIDE 150 MG/1
150 CAPSULE, EXTENDED RELEASE ORAL DAILY
Qty: 90 CAPSULE | Refills: 2 | Status: SHIPPED | OUTPATIENT
Start: 2017-09-26 | End: 2018-03-27 | Stop reason: SDUPTHER

## 2017-09-26 RX ORDER — RANITIDINE 300 MG/1
300 CAPSULE ORAL EVERY EVENING
Qty: 90 CAPSULE | Refills: 2 | Status: SHIPPED | OUTPATIENT
Start: 2017-09-26 | End: 2018-03-27 | Stop reason: SDUPTHER

## 2017-09-26 RX ORDER — SIMVASTATIN 20 MG
20 TABLET ORAL NIGHTLY
Qty: 90 TABLET | Refills: 3 | Status: SHIPPED | OUTPATIENT
Start: 2017-09-26 | End: 2018-03-27 | Stop reason: SDUPTHER

## 2017-09-26 NOTE — PROGRESS NOTES
Subjective   Kate Brambila is a 66 y.o. female.   She  is here today to f/u on   Chief Complaint   Patient presents with   • Hyperlipidemia   • Asthma   • Depression   .     History of Present Illness   Mrs. Brambila is here to follow-up on hyperlipidemia, reflux, depression, back pain with sciatica, and insomnia.  She is having fasting lipid panels done today.  Continues on her Zocor as directed.  Doing well with ranitidine 300 mg once daily.  Stable emotionally on Effexor.  Low back pain seems to be worsening.  Was given methocarbamol however was too expensive to fill.  Requests a different muscle relaxer.  Uses trazodone 200 mg nightly with good results.  She is not exercising although states plans to start.  She has no complaints or concerns today.  The following portions of the patient's history were reviewed and updated as appropriate: current medications, past family history, past medical history, past social history, past surgical history and problem list.    Review of Systems   Constitutional: Negative.    HENT: Negative.    Eyes: Negative.    Respiratory: Negative.    Cardiovascular: Negative.    Gastrointestinal: Negative.    Endocrine: Negative.    Genitourinary: Negative.    Musculoskeletal: Negative.    Skin: Negative.    Allergic/Immunologic: Negative.    Neurological: Negative.    Hematological: Negative.    Psychiatric/Behavioral: Negative.    All other systems reviewed and are negative.      Objective    Vitals:    09/26/17 0801   BP: 110/76   Pulse: 78   Resp: 16   Temp: 98.2 °F (36.8 °C)   SpO2: 96%   Body mass index is 31.17 kg/(m^2).  Allergies   Allergen Reactions   • Propofol Confusion       BP Readings from Last 3 Encounters:   09/26/17 110/76   09/18/17 113/71   08/29/17 132/78       Physical Exam   Constitutional: She is oriented to person, place, and time. She appears well-developed and well-nourished.   HENT:   Head: Normocephalic and atraumatic.   Eyes: EOM are normal.   Neck: Normal  range of motion. Neck supple. No thyromegaly present.   Cardiovascular: Normal rate, regular rhythm, normal heart sounds and intact distal pulses.    Pulmonary/Chest: Effort normal and breath sounds normal.   Musculoskeletal: Normal range of motion. She exhibits no edema.   Lymphadenopathy:     She has no cervical adenopathy.   Neurological: She is alert and oriented to person, place, and time. No cranial nerve deficit.   Skin: Skin is warm and dry.   Psychiatric: She has a normal mood and affect. Her behavior is normal. Judgment and thought content normal.   Nursing note and vitals reviewed.      Invalid input(s): 2W    Assessment/Plan   Kate was seen today for hyperlipidemia, asthma and depression.    Diagnoses and all orders for this visit:    Medicare annual wellness visit, subsequent    Mixed hyperlipidemia    Gastroesophageal reflux disease with esophagitis    Recurrent major depressive disorder, in partial remission    Chronic bilateral low back pain with bilateral sciatica    Primary insomnia    Immunization due  -     Flu Vaccine Quad PF 3YR+    Other orders  -     ranitidine (ZANTAC) 300 MG capsule; Take 1 capsule by mouth Every Evening.  -     venlafaxine XR (EFFEXOR-XR) 150 MG 24 hr capsule; Take 1 capsule by mouth Daily.  -     traZODone (DESYREL) 100 MG tablet; Take 2 tablets by mouth Every Night.  -     simvastatin (ZOCOR) 20 MG tablet; Take 1 tablet by mouth Every Night.  -     baclofen (LIORESAL) 10 MG tablet; Take 1 tablet by mouth 2 (Two) Times a Day.  -     Pneumococcal Polysaccharide Vaccine 23-Valent Greater Than or Equal To 3yo Subcutaneous / IM        Patient Instructions   Asa 81 mg qd, start exercise, complete lipid and TSH.  Call her with results.  Other fasting lab work was reviewed from the month of July

## 2017-09-26 NOTE — PATIENT INSTRUCTIONS
Asa 81 mg qd, start exercise, complete lipid and TSH.  Call her with results.  Other fasting lab work was reviewed from the month of July

## 2017-09-26 NOTE — PROGRESS NOTES
QUICK REFERENCE INFORMATION:  The ABCs of the Annual Wellness Visit    Initial Medicare Wellness Visit    HEALTH RISK ASSESSMENT    1950    Recent Hospitalizations:  No hospitalization(s) within the last year..        Current Medical Providers:  Patient Care Team:  Jaelyn Pineda MD as PCP - General  MADISON Marquez as PCP - Claims Attributed  Sherri Robison MD as Consulting Physician (Cardiology)  MADISON Nathan as Nurse Practitioner (Nurse Practitioner)  Manuel Cadena MD as Consulting Physician (Ophthalmology)        Smoking Status:  History   Smoking Status   • Former Smoker   • Types: Cigarettes   • Quit date: 1993   Smokeless Tobacco   • Never Used     Comment: caffeine use       Alcohol Consumption:  History   Alcohol Use   • Yes     Comment: social use       Depression Screen:   PHQ-2/PHQ-9 Depression Screening 9/26/2017   Little interest or pleasure in doing things 0   Feeling down, depressed, or hopeless 0   Trouble falling or staying asleep, or sleeping too much 0   Feeling tired or having little energy 0   Poor appetite or overeating 0   Feeling bad about yourself - or that you are a failure or have let yourself or your family down 0   Trouble concentrating on things, such as reading the newspaper or watching television 0   Moving or speaking so slowly that other people could have noticed. Or the opposite - being so fidgety or restless that you have been moving around a lot more than usual 0   Thoughts that you would be better off dead, or of hurting yourself in some way 0   Total Score 0   If you checked off any problems, how difficult have these problems made it for you to do your work, take care of things at home, or get along with other people? Not difficult at all       Health Habits and Functional and Cognitive Screening:  Functional & Cognitive Status 9/26/2017   Do you have difficulty preparing food and eating? No   Do you have difficulty bathing  yourself? No   Do you have difficulty getting dressed? No   Do you have difficulty using the toilet? No   Do you have difficulty moving around from place to place? No   In the past year have you fallen or experienced a near fall? Yes   Do you need help using the phone?  No   Are you deaf or do you have serious difficulty hearing?  No   Do you need help with transportation? No   Do you need help shopping? No   Do you need help preparing meals?  No   Do you need help with housework?  No   Do you need help with laundry? No   Do you need help managing money? No   Do you have difficulty concentrating, remembering or making decisions? No       Health Habits  Current Diet: Well Balanced Diet  Dental Exam: Up to date  Eye Exam: Up to date  Exercise (times per week): 2 times per week  Current Exercise Activities Include: Bicycling Outdoors          Does the patient have evidence of cognitive impairment? No    Asiprin use counseling: Start ASA 81 mg daily       Recent Lab Results:    Visual Acuity:  No exam data present    Age-appropriate Screening Schedule:  Refer to the list below for future screening recommendations based on patient's age, sex and/or medical conditions. Orders for these recommended tests are listed in the plan section. The patient has been provided with a written plan.    Health Maintenance   Topic Date Due   • TDAP/TD VACCINES (1 - Tdap) 11/18/1969   • PNEUMOCOCCAL VACCINES (65+ LOW/MEDIUM RISK) (2 of 2 - PPSV23) 11/18/2015   • LIPID PANEL  03/08/2018   • MAMMOGRAM  09/13/2019   • COLONOSCOPY  10/08/2025   • INFLUENZA VACCINE  Completed   • ZOSTER VACCINE  Addressed        Subjective   History of Present Illness    Kate Bramblia is a 66 y.o. female who presents for an Annual Wellness Visit.    The following portions of the patient's history were reviewed and updated as appropriate: current medications, past family history, past medical history, past social history, past surgical history and problem  list.    Outpatient Medications Prior to Visit   Medication Sig Dispense Refill   • albuterol (PROAIR HFA) 108 (90 BASE) MCG/ACT inhaler Inhale 2 puffs every 4 (four) hours as needed for wheezing. 8.5 g 6   • atenolol (TENORMIN) 25 MG tablet Take 1 tablet by mouth Daily. 90 tablet 3   • Cholecalciferol (VITAMIN D3) 2000 units capsule Take 2,000 Units by mouth Daily.     • dicyclomine (BENTYL) 10 MG capsule Take 1 capsule by mouth 2 (Two) Times a Day. 180 capsule 3   • ondansetron (ZOFRAN) 4 MG tablet Take 1 tablet by mouth Every 8 (Eight) Hours As Needed for Nausea or Vomiting. 30 tablet 2   • ranitidine (ZANTAC) 150 MG capsule Take 1 capsule by mouth 2 (Two) Times a Day. 60 capsule 11   • simvastatin (ZOCOR) 20 MG tablet Take 20 mg by mouth Every Night.     • traZODone (DESYREL) 100 MG tablet TAKE 2 TABLETS BY MOUTH EVERY NIGHT AT BEDTIME 180 tablet 0   • venlafaxine XR (EFFEXOR-XR) 150 MG 24 hr capsule TAKE 1 CAPSULE BY MOUTH EVERY DAY 90 capsule 0     No facility-administered medications prior to visit.        Patient Active Problem List   Diagnosis   • Abdominal bloating   • Gastroesophageal reflux disease   • Asthma with allergic rhinitis   • Depression   • Fatigue   • Hyperlipidemia   • Loose stools   • Insomnia   • Mitral valve insufficiency   • Nausea   • Reactive airway disease   • Supraventricular tachycardia   • Tricuspid valve insufficiency   • Chronic bilateral low back pain with bilateral sciatica   • Dysuria   • Urine frequency   • Urinary tract infection with hematuria   • Vitamin D insufficiency       Advance Care Planning:  has NO advance directive - information provided to the patient today    Identification of Risk Factors:  Risk factors include: weight , cardiovascular risk, inactivity and depression.    Review of Systems    Compared to one year ago, the patient feels her physical health is better.  Compared to one year ago, the patient feels her mental health is better.    Objective    "  Physical Exam    Vitals:    09/26/17 0801   BP: 110/76   BP Location: Right arm   Patient Position: Sitting   Cuff Size: Adult   Pulse: 78   Resp: 16   Temp: 98.2 °F (36.8 °C)   SpO2: 96%   Weight: 199 lb (90.3 kg)   Height: 67\" (170.2 cm)   PainSc:   4   PainLoc: Back       Body mass index is 31.17 kg/(m^2).  Discussed the patient's BMI with her. The BMI is below average; BMI management plan is completed.    Assessment/Plan   Patient Self-Management and Personalized Health Advice  The patient has been provided with information about: diet, exercise, prevention of cardiac or vascular disease and designing advance directives and preventive services including:   · Advance directive, Exercise counseling provided, Influenza vaccine, Pneumococcal vaccine .    Visit Diagnoses:    ICD-10-CM ICD-9-CM   1. Medicare annual wellness visit, subsequent Z00.00 V70.0   2. Mixed hyperlipidemia E78.2 272.2   3. Gastroesophageal reflux disease with esophagitis K21.0 530.11   4. Recurrent major depressive disorder, in partial remission F33.41 296.35   5. Chronic bilateral low back pain with bilateral sciatica M54.42 724.2    M54.41 724.3    G89.29 338.29   6. Primary insomnia F51.01 307.42   7. Immunization due Z23 V05.9       Orders Placed This Encounter   Procedures   • Pneumococcal Polysaccharide Vaccine 23-Valent Greater Than or Equal To 3yo Subcutaneous / IM   • Flu Vaccine Quad PF 3YR+       Outpatient Encounter Prescriptions as of 9/26/2017   Medication Sig Dispense Refill   • albuterol (PROAIR HFA) 108 (90 BASE) MCG/ACT inhaler Inhale 2 puffs every 4 (four) hours as needed for wheezing. 8.5 g 6   • atenolol (TENORMIN) 25 MG tablet Take 1 tablet by mouth Daily. 90 tablet 3   • Cholecalciferol (VITAMIN D3) 2000 units capsule Take 2,000 Units by mouth Daily.     • dicyclomine (BENTYL) 10 MG capsule Take 1 capsule by mouth 2 (Two) Times a Day. 180 capsule 3   • ondansetron (ZOFRAN) 4 MG tablet Take 1 tablet by mouth Every 8 " (Eight) Hours As Needed for Nausea or Vomiting. 30 tablet 2   • ranitidine (ZANTAC) 300 MG capsule Take 1 capsule by mouth Every Evening. 90 capsule 2   • simvastatin (ZOCOR) 20 MG tablet Take 1 tablet by mouth Every Night. 90 tablet 3   • traZODone (DESYREL) 100 MG tablet Take 2 tablets by mouth Every Night. 180 tablet 2   • venlafaxine XR (EFFEXOR-XR) 150 MG 24 hr capsule Take 1 capsule by mouth Daily. 90 capsule 2   • [DISCONTINUED] ranitidine (ZANTAC) 150 MG capsule Take 1 capsule by mouth 2 (Two) Times a Day. 60 capsule 11   • [DISCONTINUED] simvastatin (ZOCOR) 20 MG tablet Take 20 mg by mouth Every Night.     • [DISCONTINUED] traZODone (DESYREL) 100 MG tablet TAKE 2 TABLETS BY MOUTH EVERY NIGHT AT BEDTIME 180 tablet 0   • [DISCONTINUED] venlafaxine XR (EFFEXOR-XR) 150 MG 24 hr capsule TAKE 1 CAPSULE BY MOUTH EVERY DAY 90 capsule 0   • baclofen (LIORESAL) 10 MG tablet Take 1 tablet by mouth 2 (Two) Times a Day. 60 tablet 0     No facility-administered encounter medications on file as of 9/26/2017.        Reviewed use of high risk medication in the elderly: yes  Reviewed for potential of harmful drug interactions in the elderly: yes    Follow Up:  Return in about 6 months (around 3/26/2018) for Recheck.     An After Visit Summary and PPPS with all of these plans were given to the patient.

## 2017-09-27 LAB
CHOLEST SERPL-MCNC: 177 MG/DL (ref 0–200)
HCV AB S/CO SERPL IA: 0.1 S/CO RATIO (ref 0–0.9)
HDLC SERPL-MCNC: 59 MG/DL (ref 40–60)
LDLC SERPL CALC-MCNC: 90 MG/DL (ref 0–100)
TRIGL SERPL-MCNC: 138 MG/DL (ref 0–150)
VLDLC SERPL CALC-MCNC: 27.6 MG/DL (ref 7–27)

## 2017-09-29 ENCOUNTER — TELEPHONE (OUTPATIENT)
Dept: FAMILY MEDICINE CLINIC | Facility: CLINIC | Age: 67
End: 2017-09-29

## 2017-09-29 NOTE — TELEPHONE ENCOUNTER
----- Message from Jaelyn Pineda MD sent at 9/29/2017  9:33 AM EDT -----  Cholesterol panel has improved.  You are at goal.  Hepatitis C is negative

## 2017-10-27 RX ORDER — BACLOFEN 10 MG/1
TABLET ORAL
Qty: 60 TABLET | Refills: 0 | Status: SHIPPED | OUTPATIENT
Start: 2017-10-27 | End: 2017-11-24 | Stop reason: SDUPTHER

## 2017-11-27 RX ORDER — BACLOFEN 10 MG/1
TABLET ORAL
Qty: 60 TABLET | Refills: 0 | Status: SHIPPED | OUTPATIENT
Start: 2017-11-27 | End: 2017-12-21 | Stop reason: SDUPTHER

## 2017-12-21 RX ORDER — BACLOFEN 10 MG/1
TABLET ORAL
Qty: 60 TABLET | Refills: 0 | Status: SHIPPED | OUTPATIENT
Start: 2017-12-21 | End: 2018-01-17 | Stop reason: SDUPTHER

## 2018-01-17 RX ORDER — BACLOFEN 10 MG/1
TABLET ORAL
Qty: 60 TABLET | Refills: 0 | Status: SHIPPED | OUTPATIENT
Start: 2018-01-17 | End: 2018-02-12 | Stop reason: SDUPTHER

## 2018-02-09 RX ORDER — ONDANSETRON 4 MG/1
TABLET, FILM COATED ORAL
Qty: 30 TABLET | Refills: 0 | Status: SHIPPED | OUTPATIENT
Start: 2018-02-09 | End: 2018-03-27 | Stop reason: SDUPTHER

## 2018-02-13 RX ORDER — BACLOFEN 10 MG/1
TABLET ORAL
Qty: 60 TABLET | Refills: 0 | Status: SHIPPED | OUTPATIENT
Start: 2018-02-13 | End: 2018-03-27 | Stop reason: SDUPTHER

## 2018-03-12 RX ORDER — BACLOFEN 10 MG/1
TABLET ORAL
Qty: 60 TABLET | Refills: 0 | OUTPATIENT
Start: 2018-03-12

## 2018-03-27 ENCOUNTER — OFFICE VISIT (OUTPATIENT)
Dept: FAMILY MEDICINE CLINIC | Facility: CLINIC | Age: 68
End: 2018-03-27

## 2018-03-27 VITALS
SYSTOLIC BLOOD PRESSURE: 120 MMHG | HEIGHT: 67 IN | WEIGHT: 190 LBS | RESPIRATION RATE: 16 BRPM | TEMPERATURE: 98.3 F | DIASTOLIC BLOOD PRESSURE: 80 MMHG | OXYGEN SATURATION: 97 % | BODY MASS INDEX: 29.82 KG/M2 | HEART RATE: 86 BPM

## 2018-03-27 DIAGNOSIS — R11.0 NAUSEA: ICD-10-CM

## 2018-03-27 DIAGNOSIS — M54.41 CHRONIC BILATERAL LOW BACK PAIN WITH BILATERAL SCIATICA: ICD-10-CM

## 2018-03-27 DIAGNOSIS — R29.818 FINE MOTOR IMPAIRMENT: ICD-10-CM

## 2018-03-27 DIAGNOSIS — E78.2 MIXED HYPERLIPIDEMIA: ICD-10-CM

## 2018-03-27 DIAGNOSIS — K21.00 GASTROESOPHAGEAL REFLUX DISEASE WITH ESOPHAGITIS: ICD-10-CM

## 2018-03-27 DIAGNOSIS — J45.20 MILD INTERMITTENT ASTHMA WITH ALLERGIC RHINITIS WITHOUT COMPLICATION: ICD-10-CM

## 2018-03-27 DIAGNOSIS — R30.0 DYSURIA: Primary | ICD-10-CM

## 2018-03-27 DIAGNOSIS — R29.898 FINE MOTOR IMPAIRMENT: ICD-10-CM

## 2018-03-27 DIAGNOSIS — N30.01 ACUTE CYSTITIS WITH HEMATURIA: ICD-10-CM

## 2018-03-27 DIAGNOSIS — F51.01 PRIMARY INSOMNIA: ICD-10-CM

## 2018-03-27 DIAGNOSIS — R25.1 TREMOR OF BOTH HANDS: ICD-10-CM

## 2018-03-27 DIAGNOSIS — F33.41 RECURRENT MAJOR DEPRESSIVE DISORDER, IN PARTIAL REMISSION (HCC): ICD-10-CM

## 2018-03-27 DIAGNOSIS — M54.42 CHRONIC BILATERAL LOW BACK PAIN WITH BILATERAL SCIATICA: ICD-10-CM

## 2018-03-27 DIAGNOSIS — G89.29 CHRONIC BILATERAL LOW BACK PAIN WITH BILATERAL SCIATICA: ICD-10-CM

## 2018-03-27 LAB
ALBUMIN SERPL-MCNC: 4.6 G/DL (ref 3.5–5.2)
ALBUMIN/GLOB SERPL: 2.3 G/DL
ALP SERPL-CCNC: 98 U/L (ref 40–129)
ALT SERPL-CCNC: 22 U/L (ref 5–33)
AST SERPL-CCNC: 21 U/L (ref 5–32)
BASOPHILS # BLD AUTO: 0.03 10*3/MM3 (ref 0–0.2)
BASOPHILS NFR BLD AUTO: 0.4 % (ref 0–2)
BILIRUB BLD-MCNC: NEGATIVE MG/DL
BILIRUB SERPL-MCNC: 0.5 MG/DL (ref 0.2–1.2)
BUN SERPL-MCNC: 17 MG/DL (ref 8–23)
BUN/CREAT SERPL: 21.3 (ref 7–25)
CALCIUM SERPL-MCNC: 9.4 MG/DL (ref 8.8–10.5)
CHLORIDE SERPL-SCNC: 102 MMOL/L (ref 98–107)
CHOLEST SERPL-MCNC: 174 MG/DL (ref 0–200)
CLARITY, POC: ABNORMAL
CO2 SERPL-SCNC: 28.4 MMOL/L (ref 22–29)
COLOR UR: YELLOW
CREAT SERPL-MCNC: 0.8 MG/DL (ref 0.57–1)
EOSINOPHIL # BLD AUTO: 0.27 10*3/MM3 (ref 0.1–0.3)
EOSINOPHIL NFR BLD AUTO: 3.2 % (ref 0–4)
ERYTHROCYTE [DISTWIDTH] IN BLOOD BY AUTOMATED COUNT: 13.1 % (ref 11.5–14.5)
GFR SERPLBLD CREATININE-BSD FMLA CKD-EPI: 72 ML/MIN/1.73
GFR SERPLBLD CREATININE-BSD FMLA CKD-EPI: 87 ML/MIN/1.73
GLOBULIN SER CALC-MCNC: 2 GM/DL
GLUCOSE SERPL-MCNC: 108 MG/DL (ref 65–99)
GLUCOSE UR STRIP-MCNC: NEGATIVE MG/DL
HCT VFR BLD AUTO: 41.4 % (ref 37–47)
HDLC SERPL-MCNC: 49 MG/DL (ref 40–60)
HGB BLD-MCNC: 13.5 G/DL (ref 12–16)
IMM GRANULOCYTES # BLD: 0.02 10*3/MM3 (ref 0–0.03)
IMM GRANULOCYTES NFR BLD: 0.2 % (ref 0–0.5)
KETONES UR QL: NEGATIVE
LDLC SERPL CALC-MCNC: 91 MG/DL (ref 0–100)
LEUKOCYTE EST, POC: ABNORMAL
LYMPHOCYTES # BLD AUTO: 1.08 10*3/MM3 (ref 0.6–4.8)
LYMPHOCYTES NFR BLD AUTO: 12.6 % (ref 20–45)
MCH RBC QN AUTO: 31.5 PG (ref 27–31)
MCHC RBC AUTO-ENTMCNC: 32.6 G/DL (ref 31–37)
MCV RBC AUTO: 96.5 FL (ref 81–99)
MONOCYTES # BLD AUTO: 0.91 10*3/MM3 (ref 0–1)
MONOCYTES NFR BLD AUTO: 10.6 % (ref 3–8)
NEUTROPHILS # BLD AUTO: 6.26 10*3/MM3 (ref 1.5–8.3)
NEUTROPHILS NFR BLD AUTO: 73 % (ref 45–70)
NITRITE UR-MCNC: NEGATIVE MG/ML
NRBC BLD AUTO-RTO: 0 /100 WBC (ref 0–0)
PH UR: 6 [PH] (ref 5–8)
PLATELET # BLD AUTO: 207 10*3/MM3 (ref 140–500)
POTASSIUM SERPL-SCNC: 4.2 MMOL/L (ref 3.5–5.2)
PROT SERPL-MCNC: 6.6 G/DL (ref 6–8.5)
PROT UR STRIP-MCNC: ABNORMAL MG/DL
RBC # BLD AUTO: 4.29 10*6/MM3 (ref 4.2–5.4)
RBC # UR STRIP: ABNORMAL /UL
SODIUM SERPL-SCNC: 143 MMOL/L (ref 136–145)
SP GR UR: 1.03 (ref 1–1.03)
TRIGL SERPL-MCNC: 170 MG/DL (ref 0–150)
TSH SERPL DL<=0.005 MIU/L-ACNC: 2.7 MIU/ML (ref 0.27–4.2)
UROBILINOGEN UR QL: NORMAL
VLDLC SERPL CALC-MCNC: 34 MG/DL (ref 7–27)
WBC # BLD AUTO: 8.57 10*3/MM3 (ref 4.8–10.8)

## 2018-03-27 PROCEDURE — 81002 URINALYSIS NONAUTO W/O SCOPE: CPT | Performed by: FAMILY MEDICINE

## 2018-03-27 PROCEDURE — 99214 OFFICE O/P EST MOD 30 MIN: CPT | Performed by: FAMILY MEDICINE

## 2018-03-27 RX ORDER — ONDANSETRON 4 MG/1
4 TABLET, FILM COATED ORAL EVERY 8 HOURS PRN
Qty: 30 TABLET | Refills: 4 | OUTPATIENT
Start: 2018-03-27 | End: 2018-12-19

## 2018-03-27 RX ORDER — SIMVASTATIN 20 MG
20 TABLET ORAL NIGHTLY
Qty: 90 TABLET | Refills: 3 | Status: SHIPPED | OUTPATIENT
Start: 2018-03-27 | End: 2019-04-18 | Stop reason: SDUPTHER

## 2018-03-27 RX ORDER — ALBUTEROL SULFATE 90 UG/1
2 AEROSOL, METERED RESPIRATORY (INHALATION) EVERY 4 HOURS PRN
Qty: 8.5 G | Refills: 6 | Status: SHIPPED | OUTPATIENT
Start: 2018-03-27

## 2018-03-27 RX ORDER — DICYCLOMINE HYDROCHLORIDE 10 MG/1
10 CAPSULE ORAL
Qty: 180 CAPSULE | Refills: 3 | Status: SHIPPED | OUTPATIENT
Start: 2018-03-27 | End: 2019-08-13 | Stop reason: SDUPTHER

## 2018-03-27 RX ORDER — SULFAMETHOXAZOLE AND TRIMETHOPRIM 800; 160 MG/1; MG/1
1 TABLET ORAL 2 TIMES DAILY
Qty: 10 TABLET | Refills: 0 | Status: SHIPPED | OUTPATIENT
Start: 2018-03-27 | End: 2018-08-29 | Stop reason: HOSPADM

## 2018-03-27 RX ORDER — TRAZODONE HYDROCHLORIDE 100 MG/1
200 TABLET ORAL NIGHTLY
Qty: 180 TABLET | Refills: 2 | Status: SHIPPED | OUTPATIENT
Start: 2018-03-27 | End: 2019-04-18 | Stop reason: SDUPTHER

## 2018-03-27 RX ORDER — RANITIDINE 300 MG/1
300 CAPSULE ORAL EVERY EVENING
Qty: 90 CAPSULE | Refills: 2 | Status: SHIPPED | OUTPATIENT
Start: 2018-03-27 | End: 2019-03-19 | Stop reason: SDUPTHER

## 2018-03-27 RX ORDER — BACLOFEN 10 MG/1
10 TABLET ORAL 2 TIMES DAILY
Qty: 180 TABLET | Refills: 3 | Status: SHIPPED | OUTPATIENT
Start: 2018-03-27 | End: 2019-07-25

## 2018-03-27 RX ORDER — VENLAFAXINE HYDROCHLORIDE 150 MG/1
150 CAPSULE, EXTENDED RELEASE ORAL DAILY
Qty: 90 CAPSULE | Refills: 2 | Status: SHIPPED | OUTPATIENT
Start: 2018-03-27 | End: 2019-03-19 | Stop reason: SDUPTHER

## 2018-03-27 NOTE — PROGRESS NOTES
Subjective   Kate Brambila is a 67 y.o. female.   She  is here today to f/u on   Chief Complaint   Patient presents with   • Urinary Tract Infection   • Hyperlipidemia   • Asthma   .     History of Present Illness   Mrs. Brambila is here to follow-up on fasting lab work.  Has 6 day h/o burning, pain on urination, brian flank pain since dysuria started, frequency. Nausea but this is chronic. twitching in hands and less so in forearms. upper extremity symptoms usually improve as the day goes on. Legs twitch and feel weak when first stands.  Happens several times a week.  Couple months.  Back pain with bilateral sciatic symptoms worsening over past several months.  The following portions of the patient's history were reviewed and updated as appropriate: current medications, past family history, past medical history, past social history, past surgical history and problem list.    Review of Systems   Genitourinary: Positive for flank pain, frequency and urgency.   Neurological: Positive for tremors.   Psychiatric/Behavioral: Positive for sleep disturbance.   All other systems reviewed and are negative.      Objective    Vitals:    03/27/18 0801   BP: 120/80   Pulse: 86   Resp: 16   Temp: 98.3 °F (36.8 °C)   SpO2: 97%   Body mass index is 29.75 kg/m².  Allergies   Allergen Reactions   • Propofol Confusion       BP Readings from Last 3 Encounters:   03/27/18 120/80   02/06/18 142/64   09/26/17 110/76       Physical Exam   Constitutional: She is oriented to person, place, and time. She appears well-developed and well-nourished.   HENT:   Head: Normocephalic and atraumatic.   Eyes: EOM are normal.   Neck: Normal range of motion. Neck supple. No thyromegaly present.   Cardiovascular: Normal rate, regular rhythm, normal heart sounds and intact distal pulses.    Pulmonary/Chest: Effort normal and breath sounds normal.   Musculoskeletal: Normal range of motion. She exhibits no edema.   Abnormal finger to thumb apposition and  strength.  Bilateral.  No thenar or hypothenar atrophy.  No metacarpal atrophy.   Lymphadenopathy:     She has no cervical adenopathy.   Neurological: She is alert and oriented to person, place, and time. No cranial nerve deficit. Coordination abnormal.   Ine tremor to bilateral hands with intention.   Skin: Skin is warm and dry.   Psychiatric: She has a normal mood and affect. Her behavior is normal. Judgment and thought content normal.   Nursing note and vitals reviewed.      Invalid input(s): 2W    Assessment/Plan   Kate was seen today for urinary tract infection, hyperlipidemia and asthma.    Diagnoses and all orders for this visit:    Dysuria  -     POC Urinalysis Dipstick    Fine motor impairment  -     Vitamin B12; Future  -     Ambulatory Referral to Hand Surgery    Tremor of both hands  -     Vitamin B12; Future  -     Ambulatory Referral to Hand Surgery    Recurrent major depressive disorder, in partial remission    Acute cystitis with hematuria    Mixed hyperlipidemia  -     Comprehensive metabolic panel  -     Lipid panel  -     CBC and Differential  -     TSH  -     Vitamin B12; Future    Mild intermittent asthma with allergic rhinitis without complication    Gastroesophageal reflux disease with esophagitis  -     Comprehensive metabolic panel  -     CBC and Differential  -     TSH    Nausea  -     Comprehensive metabolic panel  -     CBC and Differential  -     TSH    Chronic bilateral low back pain with bilateral sciatica    Primary insomnia  -     TSH    Other orders  -     sulfamethoxazole-trimethoprim (BACTRIM DS,SEPTRA DS) 800-160 MG per tablet; Take 1 tablet by mouth 2 (Two) Times a Day.  -     baclofen (LIORESAL) 10 MG tablet; Take 1 tablet by mouth 2 (Two) Times a Day.  -     ondansetron (ZOFRAN) 4 MG tablet; Take 1 tablet by mouth Every 8 (Eight) Hours As Needed for Nausea or Vomiting.  -     venlafaxine XR (EFFEXOR-XR) 150 MG 24 hr capsule; Take 1 capsule by mouth Daily.  -     traZODone  (DESYREL) 100 MG tablet; Take 2 tablets by mouth Every Night.  -     simvastatin (ZOCOR) 20 MG tablet; Take 1 tablet by mouth Every Night.  -     ranitidine (ZANTAC) 300 MG capsule; Take 1 capsule by mouth Every Evening.  -     dicyclomine (BENTYL) 10 MG capsule; Take 1 capsule by mouth 4 (Four) Times a Day Before Meals & at Bedtime.  -     albuterol (PROAIR HFA) 108 (90 Base) MCG/ACT inhaler; Inhale 2 puffs Every 4 (Four) Hours As Needed for Wheezing.        Patient Instructions   She will start B6 supplement twice a day for tremor and hand weakness.  Call with results of labs.  She will let me know if there is been no improvement in her hand symptoms in the next 2-3 weeks.  Have made the referral to Dr. Parsons.  We'll see her back after hand issue and acute cystitis have cleared in regards to her leg weakness.

## 2018-03-27 NOTE — PATIENT INSTRUCTIONS
She will start B6 supplement twice a day for tremor and hand weakness.  Call with results of labs.  She will let me know if there is been no improvement in her hand symptoms in the next 2-3 weeks.  Have made the referral to Dr. Parsons.  We'll see her back after hand issue and acute cystitis have cleared in regards to her leg weakness.

## 2018-04-01 ENCOUNTER — RESULTS ENCOUNTER (OUTPATIENT)
Dept: FAMILY MEDICINE CLINIC | Facility: CLINIC | Age: 68
End: 2018-04-01

## 2018-04-01 DIAGNOSIS — R29.898 FINE MOTOR IMPAIRMENT: ICD-10-CM

## 2018-04-01 DIAGNOSIS — R29.818 FINE MOTOR IMPAIRMENT: ICD-10-CM

## 2018-04-01 DIAGNOSIS — R25.1 TREMOR OF BOTH HANDS: ICD-10-CM

## 2018-04-01 DIAGNOSIS — E78.2 MIXED HYPERLIPIDEMIA: ICD-10-CM

## 2018-04-03 LAB
BACTERIA UR CULT: ABNORMAL
OTHER ANTIBIOTIC SUSC ISLT: ABNORMAL

## 2018-08-09 RX ORDER — ATENOLOL 25 MG/1
25 TABLET ORAL DAILY
Qty: 90 TABLET | Refills: 0 | Status: SHIPPED | OUTPATIENT
Start: 2018-08-09 | End: 2018-08-29 | Stop reason: SDUPTHER

## 2018-08-29 ENCOUNTER — OFFICE VISIT (OUTPATIENT)
Dept: CARDIOLOGY | Facility: CLINIC | Age: 68
End: 2018-08-29

## 2018-08-29 VITALS
WEIGHT: 172.4 LBS | SYSTOLIC BLOOD PRESSURE: 110 MMHG | BODY MASS INDEX: 27.06 KG/M2 | HEIGHT: 67 IN | DIASTOLIC BLOOD PRESSURE: 72 MMHG | HEART RATE: 67 BPM

## 2018-08-29 DIAGNOSIS — I34.0 NON-RHEUMATIC MITRAL REGURGITATION: ICD-10-CM

## 2018-08-29 DIAGNOSIS — E78.2 MIXED HYPERLIPIDEMIA: ICD-10-CM

## 2018-08-29 DIAGNOSIS — I36.1 NON-RHEUMATIC TRICUSPID VALVE INSUFFICIENCY: ICD-10-CM

## 2018-08-29 DIAGNOSIS — R63.4 WEIGHT LOSS: ICD-10-CM

## 2018-08-29 DIAGNOSIS — I47.1 SUPRAVENTRICULAR TACHYCARDIA (HCC): Primary | ICD-10-CM

## 2018-08-29 PROBLEM — N39.0 URINARY TRACT INFECTION WITH HEMATURIA: Status: RESOLVED | Noted: 2017-07-14 | Resolved: 2018-08-29

## 2018-08-29 PROBLEM — R31.9 URINARY TRACT INFECTION WITH HEMATURIA: Status: RESOLVED | Noted: 2017-07-14 | Resolved: 2018-08-29

## 2018-08-29 PROBLEM — R30.0 DYSURIA: Status: RESOLVED | Noted: 2017-07-14 | Resolved: 2018-08-29

## 2018-08-29 PROBLEM — R35.0 URINE FREQUENCY: Status: RESOLVED | Noted: 2017-07-14 | Resolved: 2018-08-29

## 2018-08-29 PROCEDURE — 99214 OFFICE O/P EST MOD 30 MIN: CPT | Performed by: NURSE PRACTITIONER

## 2018-08-29 PROCEDURE — 93000 ELECTROCARDIOGRAM COMPLETE: CPT | Performed by: NURSE PRACTITIONER

## 2018-08-29 RX ORDER — ATENOLOL 25 MG/1
25 TABLET ORAL DAILY
Qty: 90 TABLET | Refills: 2 | Status: SHIPPED | OUTPATIENT
Start: 2018-08-29 | End: 2019-10-07 | Stop reason: SDUPTHER

## 2018-08-29 NOTE — PROGRESS NOTES
Date of Office Visit: 2018  Encounter Provider: MADISON Mathis  Place of Service: [facility] CARDIOLOGY  Patient Name: Kate Brambila  :1950    Chief Complaint   Patient presents with   • Follow-up   :     HPI: Kate Brambila is a 67 y.o. female who presents today for cardiac follow up. She is a new patient to me and her previous records have been reviewed.  Echo history of asthma, depression, anxiety, GERD, hyperlipidemia, insomnia, and glaucoma.    In  she had chest pain and had a cardiac PET scan completed which was normal.  She also is having palpitations at that time or Holter monitor which showed short runs of nonsustained supraventricular tachycardia.  She was then evaluated in 2012 for chest pain and had a stress echocardiogram completed which was normal for ischemia.  She had an echocardiogram in 2014 which revealed an EF of 60%, grade 1 diastolic dysfunction, mild mitral insufficiency, mild to moderate tricuspid insufficiency, and no other abnormalities.  She had a carotid duplex in May 2015 which revealed mild bilateral carotid plaque.    She was last in the office by Dr. Robison in 2017.  She had some episodes of dizziness which she felt was related to low blood pressure and tachycardia lasting 15-45 seconds.  She was recommended to increase her atenolol to 25 mg at nighttime.    She presents today for follow-up.  She denies any further episodes of low blood pressure, tachycardia, or dizziness.  She is tolerating the increased dose of atenolol without any adverse effects.  She further denies chest pain, shortness of air, PND, orthopnea, edema, or syncope.  Blood pressure and heart rate are both normal today.  I reviewed her CMP and lipid panel from 2018 which both looked great.  She has recently lost 30 pounds the last 4 months through the keto diet.    The following portion of the patient's history were  reviewed and updated as appropriate: past medical history, past surgical history, past social history, past family history, allergies, current medications, and problem list.    Past Medical History:   Diagnosis Date   • Abdominal bloating     chronic   • Abnormal stool color    • Acid reflux    • Acute bronchitis    • Asthma with allergic rhinitis    • Cataract extraction status of left eye    • Colon cancer screening    • Depression with anxiety    • Elevated alkaline phosphatase measurement    • Esophageal reflux    • Glaucoma    • H/O echocardiogram 09/15/2014   • H/O supraventricular tachycardia     Short runs, nonsustained PSVT per Holter monitor 2008; remains on atenolol   • History of EKG 03/10/2015   • History of Holter monitoring 08/17/2012   • Hyperlipidemia    • Insomnia    • Loose stools     intermittent   • Low back pain radiating to left leg    • Mitral valve insufficiency    • Nausea     chronic   • Nephrolithiasis    • Obesity    • Osteoarthritis    • Pneumonia    • RAD (reactive airway disease)    • Sciatica    • Sleep apnea    • Supraventricular tachycardia (CMS/HCC)    • Tricuspid valve insufficiency        Past Surgical History:   Procedure Laterality Date   • APPENDECTOMY     • BACK SURGERY     • BLADDER SURGERY     • CARPAL TUNNEL RELEASE     • CATARACT EXTRACTION     • COLONOSCOPY  10/08/2015   • HYSTERECTOMY     • REPLACEMENT TOTAL KNEE Right    • ROTATOR CUFF REPAIR     • TOTAL SHOULDER ARTHROPLASTY Left        Social History     Social History   • Marital status:      Spouse name: N/A   • Number of children: N/A   • Years of education: N/A     Occupational History   • Not on file.     Social History Main Topics   • Smoking status: Former Smoker     Types: Cigarettes     Quit date: 1993   • Smokeless tobacco: Never Used      Comment: caffeine use   • Alcohol use Yes      Comment: social use   • Drug use: No   • Sexual activity: Not on file       Family History   Problem Relation Age  of Onset   • Aneurysm Mother         brain   • Hyperlipidemia Mother    • Hypertension Mother    • Cancer Mother         lung   • Alcohol abuse Father    • Other Father         hepatic failure   • Other Son         suicide       Review of Systems   Constitution: Positive for weight loss (30 pounds). Negative for chills, diaphoresis, fever, malaise/fatigue, night sweats and weight gain.   HENT: Negative for hearing loss, nosebleeds, sore throat and tinnitus.    Eyes: Negative for blurred vision, double vision, pain and visual disturbance.   Cardiovascular: Negative for chest pain, claudication, cyanosis, dyspnea on exertion, irregular heartbeat, leg swelling, near-syncope, orthopnea, palpitations, paroxysmal nocturnal dyspnea and syncope.   Respiratory: Negative for cough, hemoptysis, shortness of breath, snoring and wheezing.    Endocrine: Negative for cold intolerance, heat intolerance and polyuria.   Hematologic/Lymphatic: Negative for bleeding problem. Does not bruise/bleed easily.   Skin: Negative for color change, dry skin, flushing and itching.   Musculoskeletal: Negative for falls, joint pain, joint swelling, muscle cramps, muscle weakness and myalgias.   Gastrointestinal: Negative for abdominal pain, constipation, heartburn, melena, nausea and vomiting.   Genitourinary: Negative for dysuria and hematuria.   Neurological: Negative for excessive daytime sleepiness, dizziness, light-headedness, loss of balance, numbness, paresthesias, seizures and vertigo.   Psychiatric/Behavioral: Negative for altered mental status, depression, memory loss and substance abuse. The patient is nervous/anxious. The patient does not have insomnia.    Allergic/Immunologic: Negative for environmental allergies.       Allergies   Allergen Reactions   • Propofol Confusion         Current Outpatient Prescriptions:   •  albuterol (PROAIR HFA) 108 (90 Base) MCG/ACT inhaler, Inhale 2 puffs Every 4 (Four) Hours As Needed for Wheezing.,  "Disp: 8.5 g, Rfl: 6  •  atenolol (TENORMIN) 25 MG tablet, Take 1 tablet by mouth Daily., Disp: 90 tablet, Rfl: 2  •  baclofen (LIORESAL) 10 MG tablet, Take 1 tablet by mouth 2 (Two) Times a Day., Disp: 180 tablet, Rfl: 3  •  Cholecalciferol (VITAMIN D3) 2000 units capsule, Take 2,000 Units by mouth Daily., Disp: , Rfl:   •  dicyclomine (BENTYL) 10 MG capsule, Take 1 capsule by mouth 4 (Four) Times a Day Before Meals & at Bedtime., Disp: 180 capsule, Rfl: 3  •  ondansetron (ZOFRAN) 4 MG tablet, Take 1 tablet by mouth Every 8 (Eight) Hours As Needed for Nausea or Vomiting., Disp: 30 tablet, Rfl: 4  •  ranitidine (ZANTAC) 300 MG capsule, Take 1 capsule by mouth Every Evening., Disp: 90 capsule, Rfl: 2  •  simvastatin (ZOCOR) 20 MG tablet, Take 1 tablet by mouth Every Night., Disp: 90 tablet, Rfl: 3  •  traZODone (DESYREL) 100 MG tablet, Take 2 tablets by mouth Every Night., Disp: 180 tablet, Rfl: 2  •  venlafaxine XR (EFFEXOR-XR) 150 MG 24 hr capsule, Take 1 capsule by mouth Daily., Disp: 90 capsule, Rfl: 2      Objective:     Vitals:    08/29/18 0840   BP: 110/72   Pulse: 67   Weight: 78.2 kg (172 lb 6.4 oz)   Height: 170.2 cm (67\")     Body mass index is 27 kg/m².    PHYSICAL EXAM:    Vitals Reviewed.   General Appearance: No acute distress, well developed and well nourished.   Eyes: Conjunctiva and lids: No erythema, swelling, or discharge. Sclera non-icteric.   HENT: Atraumatic, normocephalic. External eyes, ears, and nose normal. No hearing loss noted. Mucous membranes normal. Lips not cyanotic. Neck supple with no tenderness.  Respiratory: No signs of respiratory distress. Respiration rhythm and depth normal.   Clear to auscultation. No rales, crackles, rhonchi, or wheezing auscultated.   Cardiovascular:  Jugular Venous Pressure: Normal  Heart Rate and Rhythm: Normal, Heart Sounds: Normal S1 and S2. No S3 or S4 noted.  Murmurs: No murmurs noted. No rubs, thrills, or gallops.   Arterial Pulses: Carotid pulses " normal. No carotid bruit noted. Posterior tibialis and dorsalis pedis pulses normal.   Lower Extremities: No edema noted.  Gastrointestinal:  Abdomen soft, non-distended, non-tender. Normal bowel sounds. No hepatomegaly.   Musculoskeletal: Normal movement of extremities  Skin and Nails: General appearance normal. No pallor, cyanosis, diaphoresis. Skin temperature normal. No clubbing of fingernails.   Psychiatric: Patient alert and oriented to person, place, and time. Speech and behavior appropriate. Normal mood and affect.       ECG 12 Lead  Date/Time: 8/29/2018 8:35 AM  Performed by: AROLDO PERALTA  Authorized by: AROLDO PERALTA   Comparison: compared with previous ECG from 8/29/2017  Similar to previous ECG  Rhythm: sinus rhythm  Rate: normal  BPM: 67  Conduction: conduction normal  QRS axis: normal  Clinical impression: abnormal ECG  Comments: Nonspecific ST-T abnormalities, T wave flattening diffusely  RSR V1 and V2 Prime              Assessment:       Diagnosis Plan   1. Supraventricular tachycardia (CMS/HCC)     2. Non-rheumatic tricuspid valve insufficiency     3. Non-rheumatic mitral regurgitation     4. Mixed hyperlipidemia     5. Weight loss            Plan:       1.  Supra-ventricular tachycardia: Noted per Holter monitor in 2008 to have nonsustained short runs of SVT.  She is having some palpitations in 2017 which have resolved with the increase of atenolol to 25 mg daily.  She denies palpitations at this time.  Continue current dose of atenolol.    2.  Tricuspid Valve Insufficiency: Mild to moderate per echocardiogram September 2014.  Continue to monitor.    3.  Mitral Valve Insufficiency: Mild per echocardiogram September 2014.  I did not appreciate a heart murmur today.    4.  Hyperlipidemia: I reviewed her blood work from March 2018 and her lipid panel looked good.  This is followed by her PCP and she remains on simvastatin.    5.  Weight loss: She has lost 30 pounds following the keto diet.  I  have applauded her efforts.    5.  Follow up with Dr. Sherri Robison in one year, unless otherwise needed sooner.    As always, it has been a pleasure to participate in your patient's care.      Sincerely,         MADISON Hannon

## 2018-12-19 ENCOUNTER — HOSPITAL ENCOUNTER (EMERGENCY)
Facility: HOSPITAL | Age: 68
Discharge: HOME | End: 2018-12-19
Attending: EMERGENCY MEDICINE | Admitting: EMERGENCY MEDICINE

## 2018-12-19 ENCOUNTER — APPOINTMENT (OUTPATIENT)
Dept: CT IMAGING | Facility: HOSPITAL | Age: 68
End: 2018-12-19

## 2018-12-19 ENCOUNTER — APPOINTMENT (OUTPATIENT)
Dept: ULTRASOUND IMAGING | Facility: HOSPITAL | Age: 68
End: 2018-12-19

## 2018-12-19 VITALS
TEMPERATURE: 97.9 F | HEART RATE: 68 BPM | OXYGEN SATURATION: 98 % | DIASTOLIC BLOOD PRESSURE: 65 MMHG | HEIGHT: 67 IN | RESPIRATION RATE: 14 BRPM | BODY MASS INDEX: 26.9 KG/M2 | SYSTOLIC BLOOD PRESSURE: 132 MMHG | WEIGHT: 171.4 LBS

## 2018-12-19 DIAGNOSIS — R10.9 RIGHT FLANK PAIN: Primary | ICD-10-CM

## 2018-12-19 LAB
ALBUMIN SERPL-MCNC: 4.5 G/DL (ref 3.5–5.2)
ALBUMIN/GLOB SERPL: 1.6 G/DL
ALP SERPL-CCNC: 92 U/L (ref 39–117)
ALT SERPL W P-5'-P-CCNC: 19 U/L (ref 1–33)
ANION GAP SERPL CALCULATED.3IONS-SCNC: 11.4 MMOL/L
AST SERPL-CCNC: 20 U/L (ref 1–32)
BACTERIA UR QL AUTO: ABNORMAL /HPF
BASOPHILS # BLD AUTO: 0.03 10*3/MM3 (ref 0–0.2)
BASOPHILS NFR BLD AUTO: 0.3 % (ref 0–1.5)
BILIRUB SERPL-MCNC: 0.3 MG/DL (ref 0.1–1.2)
BILIRUB UR QL STRIP: NEGATIVE
BUN BLD-MCNC: 14 MG/DL (ref 8–23)
BUN/CREAT SERPL: 15.6 (ref 7–25)
CALCIUM SPEC-SCNC: 9.2 MG/DL (ref 8.6–10.5)
CHLORIDE SERPL-SCNC: 103 MMOL/L (ref 98–107)
CLARITY UR: CLEAR
CO2 SERPL-SCNC: 26.6 MMOL/L (ref 22–29)
COLOR UR: YELLOW
CREAT BLD-MCNC: 0.9 MG/DL (ref 0.57–1)
DEPRECATED RDW RBC AUTO: 43.2 FL (ref 37–54)
EOSINOPHIL # BLD AUTO: 0.17 10*3/MM3 (ref 0–0.7)
EOSINOPHIL NFR BLD AUTO: 1.7 % (ref 0.3–6.2)
ERYTHROCYTE [DISTWIDTH] IN BLOOD BY AUTOMATED COUNT: 12.7 % (ref 11.7–13)
GFR SERPL CREATININE-BSD FRML MDRD: 62 ML/MIN/1.73
GLOBULIN UR ELPH-MCNC: 2.8 GM/DL
GLUCOSE BLD-MCNC: 99 MG/DL (ref 65–99)
GLUCOSE UR STRIP-MCNC: NEGATIVE MG/DL
HCT VFR BLD AUTO: 39.3 % (ref 35.6–45.5)
HGB BLD-MCNC: 13.3 G/DL (ref 11.9–15.5)
HGB UR QL STRIP.AUTO: NEGATIVE
HOLD SPECIMEN: NORMAL
HYALINE CASTS UR QL AUTO: ABNORMAL /LPF
IMM GRANULOCYTES # BLD: 0.02 10*3/MM3 (ref 0–0.03)
IMM GRANULOCYTES NFR BLD: 0.2 % (ref 0–0.5)
KETONES UR QL STRIP: NEGATIVE
LEUKOCYTE ESTERASE UR QL STRIP.AUTO: ABNORMAL
LIPASE SERPL-CCNC: 31 U/L (ref 13–60)
LYMPHOCYTES # BLD AUTO: 1.95 10*3/MM3 (ref 0.9–4.8)
LYMPHOCYTES NFR BLD AUTO: 19.8 % (ref 19.6–45.3)
MCH RBC QN AUTO: 31.7 PG (ref 26.9–32)
MCHC RBC AUTO-ENTMCNC: 33.8 G/DL (ref 32.4–36.3)
MCV RBC AUTO: 93.8 FL (ref 80.5–98.2)
MONOCYTES # BLD AUTO: 0.88 10*3/MM3 (ref 0.2–1.2)
MONOCYTES NFR BLD AUTO: 8.9 % (ref 5–12)
NEUTROPHILS # BLD AUTO: 6.81 10*3/MM3 (ref 1.9–8.1)
NEUTROPHILS NFR BLD AUTO: 69.3 % (ref 42.7–76)
NITRITE UR QL STRIP: NEGATIVE
PH UR STRIP.AUTO: 5.5 [PH] (ref 5–8)
PLATELET # BLD AUTO: 192 10*3/MM3 (ref 140–500)
PMV BLD AUTO: 10.9 FL (ref 6–12)
POTASSIUM BLD-SCNC: 3.7 MMOL/L (ref 3.5–5.2)
PROT SERPL-MCNC: 7.3 G/DL (ref 6–8.5)
PROT UR QL STRIP: NEGATIVE
RBC # BLD AUTO: 4.19 10*6/MM3 (ref 3.9–5.2)
RBC # UR: ABNORMAL /HPF
REF LAB TEST METHOD: ABNORMAL
SODIUM BLD-SCNC: 141 MMOL/L (ref 136–145)
SP GR UR STRIP: 1.01 (ref 1–1.03)
SQUAMOUS #/AREA URNS HPF: ABNORMAL /HPF
UROBILINOGEN UR QL STRIP: ABNORMAL
WBC NRBC COR # BLD: 9.84 10*3/MM3 (ref 4.5–10.7)
WBC UR QL AUTO: ABNORMAL /HPF
WHOLE BLOOD HOLD SPECIMEN: NORMAL

## 2018-12-19 PROCEDURE — 99284 EMERGENCY DEPT VISIT MOD MDM: CPT

## 2018-12-19 PROCEDURE — 83690 ASSAY OF LIPASE: CPT | Performed by: EMERGENCY MEDICINE

## 2018-12-19 PROCEDURE — 80053 COMPREHEN METABOLIC PANEL: CPT | Performed by: EMERGENCY MEDICINE

## 2018-12-19 PROCEDURE — 25010000002 PROMETHAZINE PER 50 MG: Performed by: EMERGENCY MEDICINE

## 2018-12-19 PROCEDURE — 96375 TX/PRO/DX INJ NEW DRUG ADDON: CPT

## 2018-12-19 PROCEDURE — 96374 THER/PROPH/DIAG INJ IV PUSH: CPT

## 2018-12-19 PROCEDURE — 74176 CT ABD & PELVIS W/O CONTRAST: CPT

## 2018-12-19 PROCEDURE — 96376 TX/PRO/DX INJ SAME DRUG ADON: CPT

## 2018-12-19 PROCEDURE — 85025 COMPLETE CBC W/AUTO DIFF WBC: CPT | Performed by: EMERGENCY MEDICINE

## 2018-12-19 PROCEDURE — 96361 HYDRATE IV INFUSION ADD-ON: CPT

## 2018-12-19 PROCEDURE — 76705 ECHO EXAM OF ABDOMEN: CPT

## 2018-12-19 PROCEDURE — 81001 URINALYSIS AUTO W/SCOPE: CPT | Performed by: EMERGENCY MEDICINE

## 2018-12-19 PROCEDURE — 25010000002 ONDANSETRON PER 1 MG: Performed by: EMERGENCY MEDICINE

## 2018-12-19 PROCEDURE — 25010000002 HYDROMORPHONE PER 4 MG: Performed by: EMERGENCY MEDICINE

## 2018-12-19 RX ORDER — PROMETHAZINE HYDROCHLORIDE 25 MG/ML
12.5 INJECTION, SOLUTION INTRAMUSCULAR; INTRAVENOUS ONCE
Status: COMPLETED | OUTPATIENT
Start: 2018-12-19 | End: 2018-12-19

## 2018-12-19 RX ORDER — PROMETHAZINE HYDROCHLORIDE 25 MG/1
25 TABLET ORAL EVERY 6 HOURS PRN
Qty: 20 TABLET | Refills: 0 | Status: SHIPPED | OUTPATIENT
Start: 2018-12-19 | End: 2018-12-21 | Stop reason: ALTCHOICE

## 2018-12-19 RX ORDER — SODIUM CHLORIDE 0.9 % (FLUSH) 0.9 %
10 SYRINGE (ML) INJECTION AS NEEDED
Status: DISCONTINUED | OUTPATIENT
Start: 2018-12-19 | End: 2018-12-19 | Stop reason: HOSPADM

## 2018-12-19 RX ORDER — HYDROMORPHONE HYDROCHLORIDE 1 MG/ML
0.5 INJECTION, SOLUTION INTRAMUSCULAR; INTRAVENOUS; SUBCUTANEOUS ONCE
Status: COMPLETED | OUTPATIENT
Start: 2018-12-19 | End: 2018-12-19

## 2018-12-19 RX ORDER — ONDANSETRON 2 MG/ML
4 INJECTION INTRAMUSCULAR; INTRAVENOUS ONCE
Status: COMPLETED | OUTPATIENT
Start: 2018-12-19 | End: 2018-12-19

## 2018-12-19 RX ORDER — HYDROCODONE BITARTRATE AND ACETAMINOPHEN 5; 325 MG/1; MG/1
1 TABLET ORAL EVERY 6 HOURS PRN
Qty: 10 TABLET | Refills: 0 | Status: SHIPPED | OUTPATIENT
Start: 2018-12-19 | End: 2019-04-19

## 2018-12-19 RX ADMIN — HYDROMORPHONE HYDROCHLORIDE 0.5 MG: 1 INJECTION, SOLUTION INTRAMUSCULAR; INTRAVENOUS; SUBCUTANEOUS at 15:25

## 2018-12-19 RX ADMIN — ONDANSETRON 4 MG: 2 INJECTION INTRAMUSCULAR; INTRAVENOUS at 15:25

## 2018-12-19 RX ADMIN — HYDROMORPHONE HYDROCHLORIDE 0.5 MG: 1 INJECTION, SOLUTION INTRAMUSCULAR; INTRAVENOUS; SUBCUTANEOUS at 17:51

## 2018-12-19 RX ADMIN — PROMETHAZINE HYDROCHLORIDE 12.5 MG: 25 INJECTION INTRAMUSCULAR; INTRAVENOUS at 17:53

## 2018-12-19 RX ADMIN — SODIUM CHLORIDE 1000 ML: 9 INJECTION, SOLUTION INTRAVENOUS at 15:22

## 2018-12-19 RX ADMIN — PROMETHAZINE HYDROCHLORIDE 12.5 MG: 25 INJECTION INTRAMUSCULAR; INTRAVENOUS at 16:51

## 2018-12-19 NOTE — ED NOTES
Pt c/o right flank pain that started approx 6hrs ago. Pt reports n/v. Pain does not radiate. States that feels similar to prior kidney stones.     Felicia Espinoza RN  12/19/18 [id number]

## 2018-12-19 NOTE — ED PROVIDER NOTES
EMERGENCY DEPARTMENT ENCOUNTER    CHIEF COMPLAINT  Chief Complaint: flank pain  History given by: patient  History limited by: nothing  Room Number: 19/19  PMD: Jaelyn Pineda MD (Inactive)      HPI:  Pt is a 68 y.o. female, with a h/x of kidney stones, who presents complaining of right flank pain that began this morning. She also complains of nausea and vomiting secondary to pain. Pt denies any recent injury or trauma to the area. She denies numbness or tingling in her legs, gait trouble, CP, SOA, and urinary symptoms. She has no other complaints at this time.    Duration:  One day  Onset: gradual  Timing: constant  Location: right flank  Radiation: none  Quality: pain  Intensity/Severity: moderate  Progression: worsening  Associated Symptoms: N/V  Aggravating Factors: none  Alleviating Factors: none  Previous Episodes: Pt has a h/x of kidney stones  Treatment before arrival: none    PAST MEDICAL HISTORY  Active Ambulatory Problems     Diagnosis Date Noted   • Abdominal bloating 03/17/2016   • Gastroesophageal reflux disease 03/17/2016   • Asthma with allergic rhinitis 03/17/2016   • Depression 03/17/2016   • Hyperlipidemia 03/17/2016   • Insomnia 03/17/2016   • Mitral valve insufficiency 03/17/2016   • Nausea 03/17/2016   • Reactive airway disease 03/17/2016   • Supraventricular tachycardia (CMS/HCC) 03/17/2016   • Tricuspid valve insufficiency 03/17/2016   • Chronic bilateral low back pain with bilateral sciatica 03/23/2017   • Vitamin D insufficiency 07/14/2017     Resolved Ambulatory Problems     Diagnosis Date Noted   • Stool color abnormal 03/17/2016   • Chest pain 03/17/2016   • Cough 03/17/2016   • Dysuria 03/17/2016   • Elevated serum alkaline phosphatase level 03/17/2016   • Fatigue 03/17/2016   • Steatorrhea 03/17/2016   • Fecal smearing 03/17/2016   • Fever 03/17/2016   • Loose stools 03/17/2016   • Left-sided low back pain with sciatica 03/17/2016   • Pneumonia 03/17/2016   • Sciatica 03/17/2016    • Urine frequency 03/17/2016   • Acute UTI 03/17/2016   • Dysuria 07/14/2017   • Urine frequency 07/14/2017   • Urinary tract infection with hematuria 07/14/2017     Past Medical History:   Diagnosis Date   • Abdominal bloating    • Abnormal stool color    • Acid reflux    • Acute bronchitis    • Asthma with allergic rhinitis    • Cataract extraction status of left eye    • Colon cancer screening    • Depression with anxiety    • Elevated alkaline phosphatase measurement    • Esophageal reflux    • Glaucoma    • H/O echocardiogram 09/15/2014   • H/O supraventricular tachycardia    • History of EKG 03/10/2015   • History of Holter monitoring 08/17/2012   • Hyperlipidemia    • Insomnia    • Loose stools    • Low back pain radiating to left leg    • Mitral valve insufficiency    • Nausea    • Nephrolithiasis    • Obesity    • Osteoarthritis    • Pneumonia    • RAD (reactive airway disease)    • Sciatica    • Sleep apnea    • Supraventricular tachycardia (CMS/HCC)    • Tricuspid valve insufficiency        PAST SURGICAL HISTORY  Past Surgical History:   Procedure Laterality Date   • APPENDECTOMY     • BACK SURGERY     • BLADDER SURGERY     • CARPAL TUNNEL RELEASE     • CATARACT EXTRACTION     • COLONOSCOPY  10/08/2015   • FINGER/THUMB ARTHROPLASTY Left    • HYSTERECTOMY     • REPLACEMENT TOTAL KNEE Right    • ROTATOR CUFF REPAIR Bilateral    • TOTAL SHOULDER ARTHROPLASTY Bilateral        FAMILY HISTORY  Family History   Problem Relation Age of Onset   • Aneurysm Mother         brain   • Hyperlipidemia Mother    • Hypertension Mother    • Cancer Mother         lung   • Alcohol abuse Father    • Other Father         hepatic failure   • Other Son         suicide       SOCIAL HISTORY  Social History     Socioeconomic History   • Marital status:      Spouse name: Not on file   • Number of children: Not on file   • Years of education: Not on file   • Highest education level: Not on file   Social Needs   • Financial  resource strain: Not on file   • Food insecurity - worry: Not on file   • Food insecurity - inability: Not on file   • Transportation needs - medical: Not on file   • Transportation needs - non-medical: Not on file   Occupational History   • Not on file   Tobacco Use   • Smoking status: Former Smoker     Types: Cigarettes     Last attempt to quit:      Years since quittin.9   • Smokeless tobacco: Never Used   • Tobacco comment: caffeine use   Substance and Sexual Activity   • Alcohol use: Yes     Comment: social use   • Drug use: No   • Sexual activity: Not on file   Other Topics Concern   • Not on file   Social History Narrative   • Not on file       ALLERGIES  Propofol    REVIEW OF SYSTEMS  Review of Systems   Constitutional: Negative for fever.   HENT: Negative for sore throat.    Eyes: Negative.    Respiratory: Negative for cough and shortness of breath.    Cardiovascular: Negative for chest pain.   Gastrointestinal: Positive for nausea and vomiting. Negative for abdominal pain and diarrhea.   Genitourinary: Positive for flank pain (right). Negative for dysuria and urgency.   Musculoskeletal: Negative for neck pain.   Skin: Negative for rash.   Allergic/Immunologic: Negative.    Neurological: Negative for weakness, numbness and headaches.   Hematological: Negative.    Psychiatric/Behavioral: Negative.    All other systems reviewed and are negative.      PHYSICAL EXAM  ED Triage Vitals [18 1502]   Temp Heart Rate Resp BP SpO2   97.9 °F (36.6 °C) 76 -- -- 96 %      Temp src Heart Rate Source Patient Position BP Location FiO2 (%)   Tympanic Monitor -- -- --       Physical Exam   Constitutional: She is oriented to person, place, and time. No distress.   Pt appears uncomfortable   HENT:   Head: Normocephalic and atraumatic.   Eyes: EOM are normal. Pupils are equal, round, and reactive to light.   Neck: Normal range of motion. Neck supple.   Cardiovascular: Normal rate, regular rhythm and normal heart  sounds.   Pulmonary/Chest: Effort normal and breath sounds normal. No respiratory distress.   Abdominal: Soft. There is no tenderness. There is CVA tenderness (right). There is no rebound and no guarding.   Musculoskeletal: Normal range of motion. She exhibits no edema.   Neurological: She is alert and oriented to person, place, and time. She has normal sensation and normal strength.   Skin: Skin is warm and dry. No rash noted.   Psychiatric: Mood and affect normal.   Nursing note and vitals reviewed.      LAB RESULTS  Lab Results (last 24 hours)     Procedure Component Value Units Date/Time    CBC & Differential [[id number]] Collected:  12/19/18 1521    Specimen:  Blood Updated:  12/19/18 1547    Narrative:       The following orders were created for panel order CBC & Differential.  Procedure                               Abnormality         Status                     ---------                               -----------         ------                     CBC Auto Differential[[id number]]        Normal              Final result                 Please view results for these tests on the individual orders.    Comprehensive Metabolic Panel [[id number]] Collected:  12/19/18 1521    Specimen:  Blood Updated:  12/19/18 1603     Glucose 99 mg/dL      BUN 14 mg/dL      Creatinine 0.90 mg/dL      Sodium 141 mmol/L      Potassium 3.7 mmol/L      Chloride 103 mmol/L      CO2 26.6 mmol/L      Calcium 9.2 mg/dL      Total Protein 7.3 g/dL      Albumin 4.50 g/dL      ALT (SGPT) 19 U/L      AST (SGOT) 20 U/L      Alkaline Phosphatase 92 U/L      Total Bilirubin 0.3 mg/dL      eGFR Non African Amer 62 mL/min/1.73      Globulin 2.8 gm/dL      A/G Ratio 1.6 g/dL      BUN/Creatinine Ratio 15.6     Anion Gap 11.4 mmol/L     Lipase [[id number]]  (Normal) Collected:  12/19/18 1521    Specimen:  Blood Updated:  12/19/18 1603     Lipase 31 U/L     CBC Auto Differential [[id number]]  (Normal) Collected:  12/19/18 1521    Specimen:  Blood Updated:   12/19/18 1547     WBC 9.84 10*3/mm3      RBC 4.19 10*6/mm3      Hemoglobin 13.3 g/dL      Hematocrit 39.3 %      MCV 93.8 fL      MCH 31.7 pg      MCHC 33.8 g/dL      RDW 12.7 %      RDW-SD 43.2 fl      MPV 10.9 fL      Platelets 192 10*3/mm3      Neutrophil % 69.3 %      Lymphocyte % 19.8 %      Monocyte % 8.9 %      Eosinophil % 1.7 %      Basophil % 0.3 %      Immature Grans % 0.2 %      Neutrophils, Absolute 6.81 10*3/mm3      Lymphocytes, Absolute 1.95 10*3/mm3      Monocytes, Absolute 0.88 10*3/mm3      Eosinophils, Absolute 0.17 10*3/mm3      Basophils, Absolute 0.03 10*3/mm3      Immature Grans, Absolute 0.02 10*3/mm3     Urinalysis With Microscopic If Indicated (No Culture) - Urine, Clean Catch [[id number]]  (Abnormal) Collected:  12/19/18 1653    Specimen:  Urine, Clean Catch Updated:  12/19/18 1721     Color, UA Yellow     Appearance, UA Clear     pH, UA 5.5     Specific Gravity, UA 1.010     Glucose, UA Negative     Ketones, UA Negative     Bilirubin, UA Negative     Blood, UA Negative     Protein, UA Negative     Leuk Esterase, UA Small (1+)     Nitrite, UA Negative     Urobilinogen, UA 0.2 E.U./dL    Urinalysis, Microscopic Only - Urine, Clean Catch [[id number]]  (Abnormal) Collected:  12/19/18 1653    Specimen:  Urine, Clean Catch Updated:  12/19/18 1723     RBC, UA 0-2 /HPF      WBC, UA 6-12 /HPF      Bacteria, UA None Seen /HPF      Squamous Epithelial Cells, UA 0-2 /HPF      Hyaline Casts, UA None Seen /LPF      Methodology Automated Microscopy          I ordered the above labs and reviewed the results    RADIOLOGY  US Gallbladder   Final Result       Limited as described. No evidence for acute cholecystitis. With   persistent clinical indication, hepatobiliary scintigraphy could be   considered for further evaluation.                       This report was finalized on 12/19/2018 6:40 PM by Dr. Aidan Solis M.D.          CT Abdomen Pelvis Without Contrast   Final Result   Small liver cyst.  Small nonobstructing upper pole left renal   calculus. Otherwise unremarkable CT scan of the abdomen and pelvis..       This report was finalized on 12/19/2018 4:47 PM by Dr. Donald Chadwick M.D.            I ordered the above noted radiological studies. Interpreted by radiologist. Discussed with radiologist (Dr. Chadwick). Reviewed by me in PACS.       PROCEDURES  Procedures      PROGRESS AND CONSULTS     1517  Ordered labs, UA, and CT abd/pelvis for further evaluation. Ordered IV fluids for hydration, dilaudid for pain, and zofran for nausea.    1743  Rechecked Pt who is resting comfortably. Informed Pt that her CT abd/pelvis shows nothing acute. Discussed plans to treat Pt's symptoms and discharge her. She should follow up with her PCP and return to the ER if she develops any new or worsening symptoms.  Upon re-exam, Pt has significant tenderness in her RUQ. I will order a gallbladder US for further evaluation. If her gallbladder evaluation is negative, then she will be discharged.     1845  Rechecked Pt who is resting comfortably. Informed her that her gallbladder US is unremarkable. Discussed plans to discharge Pt as discussed above. Pt understands and agrees to all plans. She has already scheduled a follow up apt with her PCP. All questions answered.       MEDICAL DECISION MAKING  Results were reviewed/discussed with the patient and they were also made aware of online access. Pt also made aware that some labs, such as cultures, will not be resulted during ER visit and follow up with PMD is necessary.     MDM  Number of Diagnoses or Management Options     Amount and/or Complexity of Data Reviewed  Clinical lab tests: ordered and reviewed (Labs are unremarkable.)  Tests in the radiology section of CPT®: ordered and reviewed (CT abd/pelvis shows a 4mm stone in the upper pole of her left kidney. Her ureters are clear. )  Discussion of test results with the performing providers: yes (Dr. Chadwick (radiology))            DIAGNOSIS  Final diagnoses:   Right flank pain       DISPOSITION  DISCHARGE    Patient discharged in stable condition.    Reviewed implications of results, diagnosis, meds, responsibility to follow up, warning signs and symptoms of possible worsening, potential complications and reasons to return to ER, including new or worsening symptoms.    Patient/Family voiced understanding of above instructions.    Discussed plan for discharge, as there is no emergent indication for admission. Patient referred to primary care provider for BP management due to today's BP. Pt/family is agreeable and understands need for follow up and repeat testing.  Pt is aware that discharge does not mean that nothing is wrong but it indicates no emergency is present that requires admission and they must continue care with follow-up as given below or physician of their choice.     FOLLOW-UP  Jaelyn Pineda MD  [address] [facility] [id number] [phone]    Schedule an appointment as soon as possible for a visit   for continued evaluation of symptoms         Medication List      New Prescriptions    HYDROcodone-acetaminophen 5-325 MG per tablet  Commonly known as:  NORCO  Take 1 tablet by mouth Every 6 (Six) Hours As Needed for Moderate Pain .     promethazine 25 MG tablet  Commonly known as:  PHENERGAN  Take 1 tablet by mouth Every 6 (Six) Hours As Needed for Nausea or   Vomiting.        Stop    ondansetron 4 MG tablet  Commonly known as:  ZOFRAN              Latest Documented Vital Signs:  As of 6:47 PM  BP- 117/47 HR- 69 Temp- 97.9 °F (36.6 °C) (Tympanic) O2 sat- 92%    --  Documentation assistance provided by bean Chambers for Dr. Baugh.  Information recorded by the scrmarcelloe was done at my direction and has been verified and validated by me.     Melania Chambers  12/19/18 1847       Jani Baugh MD  12/19/18 1937

## 2018-12-21 ENCOUNTER — OFFICE VISIT (OUTPATIENT)
Dept: FAMILY MEDICINE CLINIC | Facility: CLINIC | Age: 68
End: 2018-12-21

## 2018-12-21 VITALS
WEIGHT: 170 LBS | DIASTOLIC BLOOD PRESSURE: 80 MMHG | BODY MASS INDEX: 26.68 KG/M2 | TEMPERATURE: 98.7 F | RESPIRATION RATE: 16 BRPM | SYSTOLIC BLOOD PRESSURE: 130 MMHG | HEIGHT: 67 IN | OXYGEN SATURATION: 96 % | HEART RATE: 90 BPM

## 2018-12-21 DIAGNOSIS — R11.0 NAUSEA: Primary | ICD-10-CM

## 2018-12-21 PROCEDURE — 99213 OFFICE O/P EST LOW 20 MIN: CPT | Performed by: NURSE PRACTITIONER

## 2018-12-21 RX ORDER — ONDANSETRON 4 MG/1
4 TABLET, FILM COATED ORAL EVERY 8 HOURS PRN
Qty: 20 TABLET | Refills: 0 | Status: SHIPPED | OUTPATIENT
Start: 2018-12-21 | End: 2019-04-19 | Stop reason: SDUPTHER

## 2018-12-21 RX ORDER — ONDANSETRON 4 MG/1
4 TABLET, FILM COATED ORAL EVERY 8 HOURS PRN
Qty: 20 TABLET | Refills: 0 | Status: SHIPPED | OUTPATIENT
Start: 2018-12-21 | End: 2018-12-21 | Stop reason: SDUPTHER

## 2018-12-21 NOTE — PROGRESS NOTES
Patient ID: Kate Brambila is a 68 y.o. female     Subjective     Chief Complaint   Patient presents with   • Nausea       History of Present Illness    Kate Brambila presents to the office today for ER follow-up.  She was seen 2 days ago due to right flank pain and N/V.  Work-up was essentially negative including GB ultrasound.  She was instructed to follow up with PCP.  She states since then, her symptoms have improved.  No vomiting.  Still has nausea however feel is improving.  No longer having flank pain.  Slight epigastric tenderness in which she states has also improved.  She was prescribed Phenergan from the ER however she states she rather have Zofran.    She denies any complaints of fever, chills, cough, chest pain, shortness of air, or any other concerns.     The following portions of the patient's history were reviewed and updated as appropriate: allergies, current medications, past family history, past medical history, past social history, past surgical history and problem list.       Review of Systems   Constitution: Negative. Negative for fever.   HENT: Negative.    Eyes: Negative.    Cardiovascular: Negative.    Respiratory: Negative.    Endocrine: Negative.    Hematologic/Lymphatic: Negative.    Skin: Negative.    Musculoskeletal: Negative.    Gastrointestinal: Positive for nausea and vomiting. Negative for abdominal pain.   Genitourinary: Negative.    Neurological: Negative.    Psychiatric/Behavioral: Negative.        Vitals:    12/21/18 1357   BP: 130/80   Pulse: 90   Resp: 16   Temp: 98.7 °F (37.1 °C)   SpO2: 96%       Documented weights    12/21/18 1357   Weight: 77.1 kg (170 lb)       Results for orders placed or performed during the hospital encounter of 12/19/18   Comprehensive Metabolic Panel   Result Value Ref Range    Glucose 99 65 - 99 mg/dL    BUN 14 8 - 23 mg/dL    Creatinine 0.90 0.57 - 1.00 mg/dL    Sodium 141 136 - 145 mmol/L    Potassium 3.7 3.5 - 5.2 mmol/L    Chloride 103 98  - 107 mmol/L    CO2 26.6 22.0 - 29.0 mmol/L    Calcium 9.2 8.6 - 10.5 mg/dL    Total Protein 7.3 6.0 - 8.5 g/dL    Albumin 4.50 3.50 - 5.20 g/dL    ALT (SGPT) 19 1 - 33 U/L    AST (SGOT) 20 1 - 32 U/L    Alkaline Phosphatase 92 39 - 117 U/L    Total Bilirubin 0.3 0.1 - 1.2 mg/dL    eGFR Non African Amer 62 >60 mL/min/1.73    Globulin 2.8 gm/dL    A/G Ratio 1.6 g/dL    BUN/Creatinine Ratio 15.6 7.0 - 25.0    Anion Gap 11.4 mmol/L   Lipase   Result Value Ref Range    Lipase 31 13 - 60 U/L   Urinalysis With Microscopic If Indicated (No Culture) - Urine, Clean Catch   Result Value Ref Range    Color, UA Yellow Yellow, Straw    Appearance, UA Clear Clear    pH, UA 5.5 5.0 - 8.0    Specific Gravity, UA 1.010 1.005 - 1.030    Glucose, UA Negative Negative    Ketones, UA Negative Negative    Bilirubin, UA Negative Negative    Blood, UA Negative Negative    Protein, UA Negative Negative    Leuk Esterase, UA Small (1+) (A) Negative    Nitrite, UA Negative Negative    Urobilinogen, UA 0.2 E.U./dL 0.2 - 1.0 E.U./dL   CBC Auto Differential   Result Value Ref Range    WBC 9.84 4.50 - 10.70 10*3/mm3    RBC 4.19 3.90 - 5.20 10*6/mm3    Hemoglobin 13.3 11.9 - 15.5 g/dL    Hematocrit 39.3 35.6 - 45.5 %    MCV 93.8 80.5 - 98.2 fL    MCH 31.7 26.9 - 32.0 pg    MCHC 33.8 32.4 - 36.3 g/dL    RDW 12.7 11.7 - 13.0 %    RDW-SD 43.2 37.0 - 54.0 fl    MPV 10.9 6.0 - 12.0 fL    Platelets 192 140 - 500 10*3/mm3    Neutrophil % 69.3 42.7 - 76.0 %    Lymphocyte % 19.8 19.6 - 45.3 %    Monocyte % 8.9 5.0 - 12.0 %    Eosinophil % 1.7 0.3 - 6.2 %    Basophil % 0.3 0.0 - 1.5 %    Immature Grans % 0.2 0.0 - 0.5 %    Neutrophils, Absolute 6.81 1.90 - 8.10 10*3/mm3    Lymphocytes, Absolute 1.95 0.90 - 4.80 10*3/mm3    Monocytes, Absolute 0.88 0.20 - 1.20 10*3/mm3    Eosinophils, Absolute 0.17 0.00 - 0.70 10*3/mm3    Basophils, Absolute 0.03 0.00 - 0.20 10*3/mm3    Immature Grans, Absolute 0.02 0.00 - 0.03 10*3/mm3   Urinalysis, Microscopic Only - Urine,  Clean Catch   Result Value Ref Range    RBC, UA 0-2 None Seen, 0-2 /HPF    WBC, UA 6-12 (A) None Seen, 0-2 /HPF    Bacteria, UA None Seen None Seen /HPF    Squamous Epithelial Cells, UA 0-2 None Seen, 0-2 /HPF    Hyaline Casts, UA None Seen None Seen /LPF    Methodology Automated Microscopy    Light Blue Top   Result Value Ref Range    Extra Tube hold for add-on    Gold Top - SST   Result Value Ref Range    Extra Tube Hold for add-ons.        Objective     Physical Exam   Constitutional: She is oriented to person, place, and time. She appears well-developed and well-nourished. No distress.   HENT:   Head: Normocephalic and atraumatic.   Mouth/Throat: Oropharynx is clear and moist and mucous membranes are normal.   Cardiovascular: Normal rate and regular rhythm.   No murmur heard.  Pulmonary/Chest: Effort normal and breath sounds normal. No respiratory distress.   Abdominal: Soft. Bowel sounds are normal. There is tenderness (epigastric).   Musculoskeletal: She exhibits no edema.   Neurological: She is alert and oriented to person, place, and time.   Skin: Skin is warm and dry.   Psychiatric: She has a normal mood and affect.       Assessment/Plan     Assessment/Plan   Kate was seen today for nausea.    Diagnoses and all orders for this visit:    Nausea  -     ondansetron (ZOFRAN) 4 MG tablet; Take 1 tablet by mouth Every 8 (Eight) Hours As Needed for Nausea or Vomiting.      Summary:  Kate Brambila condition has improved since she was seen in the ER.  Instructed to continue current recommendations, clear liquids and advance as tolerated.  Zofran as needed for nausea.  If symptoms do not continue to improve or worsen, she knows to notify our office.      In the meantime, instructed to contact us sooner for any problems or concerns.    Daria Hughes, APRN  Family Medicine  [facility]  12/21/18  2:44 PM

## 2019-03-19 RX ORDER — RANITIDINE 300 MG/1
300 CAPSULE ORAL EVERY EVENING
Qty: 90 CAPSULE | Refills: 1 | Status: SHIPPED | OUTPATIENT
Start: 2019-03-19 | End: 2019-09-14 | Stop reason: SDUPTHER

## 2019-03-19 RX ORDER — VENLAFAXINE HYDROCHLORIDE 150 MG/1
150 CAPSULE, EXTENDED RELEASE ORAL DAILY
Qty: 90 CAPSULE | Refills: 1 | Status: SHIPPED | OUTPATIENT
Start: 2019-03-19 | End: 2019-07-11 | Stop reason: SDUPTHER

## 2019-04-18 RX ORDER — TRAZODONE HYDROCHLORIDE 100 MG/1
200 TABLET ORAL NIGHTLY
Qty: 180 TABLET | Refills: 1 | Status: SHIPPED | OUTPATIENT
Start: 2019-04-18 | End: 2019-10-07 | Stop reason: SDUPTHER

## 2019-04-18 RX ORDER — SIMVASTATIN 20 MG
20 TABLET ORAL NIGHTLY
Qty: 90 TABLET | Refills: 1 | Status: SHIPPED | OUTPATIENT
Start: 2019-04-18 | End: 2019-10-07 | Stop reason: SDUPTHER

## 2019-04-19 ENCOUNTER — OFFICE VISIT (OUTPATIENT)
Dept: FAMILY MEDICINE CLINIC | Facility: CLINIC | Age: 69
End: 2019-04-19

## 2019-04-19 VITALS
RESPIRATION RATE: 16 BRPM | WEIGHT: 172 LBS | SYSTOLIC BLOOD PRESSURE: 122 MMHG | HEART RATE: 78 BPM | OXYGEN SATURATION: 96 % | BODY MASS INDEX: 27 KG/M2 | DIASTOLIC BLOOD PRESSURE: 78 MMHG | TEMPERATURE: 98.1 F | HEIGHT: 67 IN

## 2019-04-19 DIAGNOSIS — R11.0 NAUSEA: ICD-10-CM

## 2019-04-19 DIAGNOSIS — F41.8 DEPRESSION WITH ANXIETY: Primary | ICD-10-CM

## 2019-04-19 PROCEDURE — 99213 OFFICE O/P EST LOW 20 MIN: CPT | Performed by: NURSE PRACTITIONER

## 2019-04-19 RX ORDER — VENLAFAXINE HYDROCHLORIDE 37.5 MG/1
CAPSULE, EXTENDED RELEASE ORAL
Qty: 60 CAPSULE | Refills: 1 | Status: SHIPPED | OUTPATIENT
Start: 2019-04-19 | End: 2019-05-17 | Stop reason: DRUGHIGH

## 2019-04-19 RX ORDER — ONDANSETRON 4 MG/1
4 TABLET, FILM COATED ORAL EVERY 8 HOURS PRN
Qty: 20 TABLET | Refills: 0 | Status: SHIPPED | OUTPATIENT
Start: 2019-04-19 | End: 2019-07-11 | Stop reason: SDUPTHER

## 2019-04-19 RX ORDER — SCOLOPAMINE TRANSDERMAL SYSTEM 1 MG/1
1 PATCH, EXTENDED RELEASE TRANSDERMAL
Qty: 4 EACH | Refills: 0 | Status: SHIPPED | OUTPATIENT
Start: 2019-04-19 | End: 2019-07-25

## 2019-04-19 NOTE — PROGRESS NOTES
Patient ID: Kate Brambila is a 68 y.o. female     Subjective     Chief Complaint   Patient presents with   • Depression       Depression   Visit Type: follow-up  Patient presents with the following symptoms: depressed mood, insomnia and nervousness/anxiety.  Frequency of symptoms: constantly   Episode duration: 1 day  Severity: interfering with daily activities   Sleep quality: non-restorative  Nighttime awakenings: several  Compliance with medications:  %  Treatment side effects: Been taking Effexor  mg daily for quite some time now.  She feels the Effexor has worked the best for her had tried other medications in the past.  However does not feel the Effexor is working as well as it used to.  She is interested in staying on s.  Same medication however went to see if able to increase dosage.    Also getting ready to go on cruise in May.  First time going on cruise.  Worried about motion sickness due to problems with flying and riding in car.  Usually takes dramamine however makes her sleepy.    The following portions of the patient's history were reviewed and updated as appropriate: allergies, current medications, past family history, past medical history, past social history, past surgical history and problem list.       Review of Systems   Constitution: Negative.   HENT: Negative.    Eyes: Negative.    Cardiovascular: Negative.    Respiratory: Negative.    Endocrine: Negative.    Hematologic/Lymphatic: Negative.    Skin: Negative.    Musculoskeletal: Negative.    Gastrointestinal: Negative.    Genitourinary: Negative.    Neurological: Negative.    Psychiatric/Behavioral: The patient has insomnia and is nervous/anxious.        Vitals:    04/19/19 0810   BP: 122/78   Pulse: 78   Resp: 16   Temp: 98.1 °F (36.7 °C)   SpO2: 96%       Documented weights    04/19/19 0810   Weight: 78 kg (172 lb)     Body mass index is 26.94 kg/m².    Results for orders placed or performed during the hospital encounter of  12/19/18   Comprehensive Metabolic Panel   Result Value Ref Range    Glucose 99 65 - 99 mg/dL    BUN 14 8 - 23 mg/dL    Creatinine 0.90 0.57 - 1.00 mg/dL    Sodium 141 136 - 145 mmol/L    Potassium 3.7 3.5 - 5.2 mmol/L    Chloride 103 98 - 107 mmol/L    CO2 26.6 22.0 - 29.0 mmol/L    Calcium 9.2 8.6 - 10.5 mg/dL    Total Protein 7.3 6.0 - 8.5 g/dL    Albumin 4.50 3.50 - 5.20 g/dL    ALT (SGPT) 19 1 - 33 U/L    AST (SGOT) 20 1 - 32 U/L    Alkaline Phosphatase 92 39 - 117 U/L    Total Bilirubin 0.3 0.1 - 1.2 mg/dL    eGFR Non African Amer 62 >60 mL/min/1.73    Globulin 2.8 gm/dL    A/G Ratio 1.6 g/dL    BUN/Creatinine Ratio 15.6 7.0 - 25.0    Anion Gap 11.4 mmol/L   Lipase   Result Value Ref Range    Lipase 31 13 - 60 U/L   Urinalysis With Microscopic If Indicated (No Culture) - Urine, Clean Catch   Result Value Ref Range    Color, UA Yellow Yellow, Straw    Appearance, UA Clear Clear    pH, UA 5.5 5.0 - 8.0    Specific Gravity, UA 1.010 1.005 - 1.030    Glucose, UA Negative Negative    Ketones, UA Negative Negative    Bilirubin, UA Negative Negative    Blood, UA Negative Negative    Protein, UA Negative Negative    Leuk Esterase, UA Small (1+) (A) Negative    Nitrite, UA Negative Negative    Urobilinogen, UA 0.2 E.U./dL 0.2 - 1.0 E.U./dL   CBC Auto Differential   Result Value Ref Range    WBC 9.84 4.50 - 10.70 10*3/mm3    RBC 4.19 3.90 - 5.20 10*6/mm3    Hemoglobin 13.3 11.9 - 15.5 g/dL    Hematocrit 39.3 35.6 - 45.5 %    MCV 93.8 80.5 - 98.2 fL    MCH 31.7 26.9 - 32.0 pg    MCHC 33.8 32.4 - 36.3 g/dL    RDW 12.7 11.7 - 13.0 %    RDW-SD 43.2 37.0 - 54.0 fl    MPV 10.9 6.0 - 12.0 fL    Platelets 192 140 - 500 10*3/mm3    Neutrophil % 69.3 42.7 - 76.0 %    Lymphocyte % 19.8 19.6 - 45.3 %    Monocyte % 8.9 5.0 - 12.0 %    Eosinophil % 1.7 0.3 - 6.2 %    Basophil % 0.3 0.0 - 1.5 %    Immature Grans % 0.2 0.0 - 0.5 %    Neutrophils, Absolute 6.81 1.90 - 8.10 10*3/mm3    Lymphocytes, Absolute 1.95 0.90 - 4.80 10*3/mm3     Monocytes, Absolute 0.88 0.20 - 1.20 10*3/mm3    Eosinophils, Absolute 0.17 0.00 - 0.70 10*3/mm3    Basophils, Absolute 0.03 0.00 - 0.20 10*3/mm3    Immature Grans, Absolute 0.02 0.00 - 0.03 10*3/mm3   Urinalysis, Microscopic Only - Urine, Clean Catch   Result Value Ref Range    RBC, UA 0-2 None Seen, 0-2 /HPF    WBC, UA 6-12 (A) None Seen, 0-2 /HPF    Bacteria, UA None Seen None Seen /HPF    Squamous Epithelial Cells, UA 0-2 None Seen, 0-2 /HPF    Hyaline Casts, UA None Seen None Seen /LPF    Methodology Automated Microscopy    Light Blue Top   Result Value Ref Range    Extra Tube hold for add-on    Gold Top - SST   Result Value Ref Range    Extra Tube Hold for add-ons.        Objective     Physical Exam   Constitutional: She is oriented to person, place, and time. She appears well-developed and well-nourished. No distress.   HENT:   Head: Normocephalic and atraumatic.   Eyes: EOM are normal. Pupils are equal, round, and reactive to light.   Neck: Normal range of motion. Neck supple.   Cardiovascular: Normal rate and regular rhythm.   No murmur heard.  Pulmonary/Chest: Effort normal and breath sounds normal. No respiratory distress.   Abdominal: Soft. She exhibits no distension.   Musculoskeletal: Normal range of motion. She exhibits no edema.   Neurological: She is oriented to person, place, and time.   Skin: Skin is warm and dry.   Psychiatric: She has a normal mood and affect.          Assessment/Plan     Assessment/Plan   Kate was seen today for depression.    Diagnoses and all orders for this visit:    Depression with anxiety    Nausea  -     ondansetron (ZOFRAN) 4 MG tablet; Take 1 tablet by mouth Every 8 (Eight) Hours As Needed for Nausea or Vomiting.    Other orders  -     venlafaxine XR (EFFEXOR XR) 37.5 MG 24 hr capsule; Take 1 tablet daily for one week, then increase to 2 tablets daily thereafter.  Take along with 150 mg.  -     Scopolamine (TRANSDERM-SCOP, 1.5 MG,) 1.5 MG/3DAYS patch; Place 1 patch  on the skin as directed by provider Every 72 (Seventy-Two) Hours.      Summary:  Kate Brambila presents to the office today concerning prior history of depression.  Currently she is taking 150 mg XR daily.  She is interested in staying on same medication due to working well for her in the past.  We will increase her up to 225 mg a day.  Prescribed Effexor XR 37.5 mg.  Instructed to take 1 daily along with the 150 mg dose over the next week.  Then may increase to taking 2 of the 37.5 mg along with the 150 mg thereafter.  If doing well on this dosage, will send appropriate prescription for next refill.  Notify us if no improvement.  Also instructed time for her yearly fasting labs.  Instructed to schedule for lab appointment for when she returns from her cruise.  I will call her with those results.  If stable recommend follow-up in 1 year.  Prescriptions given for scopolamine patch and Zofran to take as directed .      In the meantime, instructed to contact us sooner for any problems or concerns.    Daria Hughes, APRN  Family Medicine  [facility] [place] [facility]  04/19/19  1:38 PM

## 2019-05-16 DIAGNOSIS — J45.909 INTRINSIC ASTHMA WITHOUT STATUS ASTHMATICUS WITHOUT COMPLICATION, UNSPECIFIED ASTHMA SEVERITY: ICD-10-CM

## 2019-05-16 DIAGNOSIS — E78.5 HYPERLIPIDEMIA, UNSPECIFIED HYPERLIPIDEMIA TYPE: Primary | ICD-10-CM

## 2019-05-17 ENCOUNTER — TELEPHONE (OUTPATIENT)
Dept: FAMILY MEDICINE CLINIC | Facility: CLINIC | Age: 69
End: 2019-05-17

## 2019-05-17 RX ORDER — VENLAFAXINE HYDROCHLORIDE 75 MG/1
75 CAPSULE, EXTENDED RELEASE ORAL DAILY
Qty: 90 CAPSULE | Refills: 1 | Status: SHIPPED | OUTPATIENT
Start: 2019-05-17 | End: 2019-11-08 | Stop reason: SDUPTHER

## 2019-05-17 RX ORDER — VENLAFAXINE HYDROCHLORIDE 75 MG/1
75 CAPSULE, EXTENDED RELEASE ORAL DAILY
Qty: 90 CAPSULE | Refills: 1 | Status: SHIPPED | OUTPATIENT
Start: 2019-05-17 | End: 2019-05-17 | Stop reason: SDUPTHER

## 2019-05-17 NOTE — TELEPHONE ENCOUNTER
Pt says the 75mg Effexor is working well, is requesting an rx for 75mg instead of two 37.5 to [facility] on [address]

## 2019-05-18 LAB
ALBUMIN SERPL-MCNC: 3.7 G/DL (ref 3.5–5.2)
ALBUMIN/GLOB SERPL: 1.2 G/DL
ALP SERPL-CCNC: 87 U/L (ref 39–117)
ALT SERPL-CCNC: 21 U/L (ref 1–33)
AST SERPL-CCNC: 21 U/L (ref 1–32)
BASOPHILS # BLD AUTO: 0.04 10*3/MM3 (ref 0–0.2)
BASOPHILS NFR BLD AUTO: 0.6 % (ref 0–1.5)
BILIRUB SERPL-MCNC: 0.2 MG/DL (ref 0.2–1.2)
BUN SERPL-MCNC: 13 MG/DL (ref 8–23)
BUN/CREAT SERPL: 15.7 (ref 7–25)
CALCIUM SERPL-MCNC: 9.7 MG/DL (ref 8.6–10.5)
CHLORIDE SERPL-SCNC: 105 MMOL/L (ref 98–107)
CHOLEST SERPL-MCNC: 174 MG/DL (ref 0–200)
CO2 SERPL-SCNC: 28.3 MMOL/L (ref 22–29)
CREAT SERPL-MCNC: 0.83 MG/DL (ref 0.57–1)
EOSINOPHIL # BLD AUTO: 0.49 10*3/MM3 (ref 0–0.4)
EOSINOPHIL NFR BLD AUTO: 7.3 % (ref 0.3–6.2)
ERYTHROCYTE [DISTWIDTH] IN BLOOD BY AUTOMATED COUNT: 12.4 % (ref 12.3–15.4)
GLOBULIN SER CALC-MCNC: 3 GM/DL
GLUCOSE SERPL-MCNC: 105 MG/DL (ref 65–99)
HCT VFR BLD AUTO: 42.2 % (ref 34–46.6)
HDLC SERPL-MCNC: 54 MG/DL (ref 40–60)
HGB BLD-MCNC: 13.3 G/DL (ref 12–15.9)
IMM GRANULOCYTES # BLD AUTO: 0.02 10*3/MM3 (ref 0–0.05)
IMM GRANULOCYTES NFR BLD AUTO: 0.3 % (ref 0–0.5)
LDLC SERPL CALC-MCNC: 102 MG/DL (ref 0–100)
LYMPHOCYTES # BLD AUTO: 1.46 10*3/MM3 (ref 0.7–3.1)
LYMPHOCYTES NFR BLD AUTO: 21.8 % (ref 19.6–45.3)
MCH RBC QN AUTO: 31.7 PG (ref 26.6–33)
MCHC RBC AUTO-ENTMCNC: 31.5 G/DL (ref 31.5–35.7)
MCV RBC AUTO: 100.5 FL (ref 79–97)
MONOCYTES # BLD AUTO: 0.65 10*3/MM3 (ref 0.1–0.9)
MONOCYTES NFR BLD AUTO: 9.7 % (ref 5–12)
NEUTROPHILS # BLD AUTO: 4.05 10*3/MM3 (ref 1.7–7)
NEUTROPHILS NFR BLD AUTO: 60.3 % (ref 42.7–76)
NRBC BLD AUTO-RTO: 0 /100 WBC (ref 0–0.2)
PLATELET # BLD AUTO: 176 10*3/MM3 (ref 140–450)
POTASSIUM SERPL-SCNC: 4.7 MMOL/L (ref 3.5–5.2)
PROT SERPL-MCNC: 6.7 G/DL (ref 6–8.5)
RBC # BLD AUTO: 4.2 10*6/MM3 (ref 3.77–5.28)
SODIUM SERPL-SCNC: 144 MMOL/L (ref 136–145)
TRIGL SERPL-MCNC: 91 MG/DL (ref 0–150)
TSH SERPL DL<=0.005 MIU/L-ACNC: 2.2 MIU/ML (ref 0.27–4.2)
VLDLC SERPL CALC-MCNC: 18.2 MG/DL
WBC # BLD AUTO: 6.71 10*3/MM3 (ref 3.4–10.8)

## 2019-07-11 DIAGNOSIS — R11.0 NAUSEA: ICD-10-CM

## 2019-07-11 RX ORDER — VENLAFAXINE HYDROCHLORIDE 150 MG/1
150 CAPSULE, EXTENDED RELEASE ORAL DAILY
Qty: 90 CAPSULE | Refills: 1 | Status: SHIPPED | OUTPATIENT
Start: 2019-07-11 | End: 2019-12-30

## 2019-07-11 RX ORDER — ONDANSETRON 4 MG/1
4 TABLET, FILM COATED ORAL EVERY 8 HOURS PRN
Qty: 20 TABLET | Refills: 0 | Status: SHIPPED | OUTPATIENT
Start: 2019-07-11

## 2019-07-25 ENCOUNTER — OFFICE VISIT (OUTPATIENT)
Dept: CARDIOLOGY | Facility: CLINIC | Age: 69
End: 2019-07-25

## 2019-07-25 ENCOUNTER — TELEPHONE (OUTPATIENT)
Dept: CARDIOLOGY | Facility: CLINIC | Age: 69
End: 2019-07-25

## 2019-07-25 ENCOUNTER — HOSPITAL ENCOUNTER (OUTPATIENT)
Dept: CARDIOLOGY | Facility: HOSPITAL | Age: 69
Discharge: HOME | End: 2019-07-25
Admitting: INTERNAL MEDICINE

## 2019-07-25 VITALS
HEIGHT: 67 IN | BODY MASS INDEX: 27 KG/M2 | HEART RATE: 67 BPM | WEIGHT: 172 LBS | SYSTOLIC BLOOD PRESSURE: 118 MMHG | DIASTOLIC BLOOD PRESSURE: 88 MMHG

## 2019-07-25 VITALS
DIASTOLIC BLOOD PRESSURE: 88 MMHG | WEIGHT: 172 LBS | HEIGHT: 67 IN | BODY MASS INDEX: 27 KG/M2 | SYSTOLIC BLOOD PRESSURE: 118 MMHG | HEART RATE: 55 BPM

## 2019-07-25 DIAGNOSIS — R07.2 PRECORDIAL PAIN: ICD-10-CM

## 2019-07-25 DIAGNOSIS — E78.2 MIXED HYPERLIPIDEMIA: ICD-10-CM

## 2019-07-25 DIAGNOSIS — I34.0 NON-RHEUMATIC MITRAL REGURGITATION: Primary | ICD-10-CM

## 2019-07-25 DIAGNOSIS — I47.1 SUPRAVENTRICULAR TACHYCARDIA (HCC): ICD-10-CM

## 2019-07-25 DIAGNOSIS — I36.1 NON-RHEUMATIC TRICUSPID VALVE INSUFFICIENCY: ICD-10-CM

## 2019-07-25 LAB
AORTIC ROOT ANNULUS: 2.1 CM
ASCENDING AORTA: 3 CM
BH CV ECHO MEAS - ACS: 1.8 CM
BH CV ECHO MEAS - AO MAX PG: 5.3 MMHG
BH CV ECHO MEAS - AO V2 MAX: 115.4 CM/SEC
BH CV ECHO MEAS - ASC AORTA: 3 CM
BH CV ECHO MEAS - BSA(HAYCOCK): 1.9 M^2
BH CV ECHO MEAS - BSA: 1.9 M^2
BH CV ECHO MEAS - BZI_BMI: 26.9 KILOGRAMS/M^2
BH CV ECHO MEAS - BZI_METRIC_HEIGHT: 170.2 CM
BH CV ECHO MEAS - BZI_METRIC_WEIGHT: 78 KG
BH CV ECHO MEAS - EDV(MOD-SP2): 58 ML
BH CV ECHO MEAS - EDV(MOD-SP4): 51 ML
BH CV ECHO MEAS - EDV(TEICH): 137.7 ML
BH CV ECHO MEAS - EF(CUBED): 72.9 %
BH CV ECHO MEAS - EF(MOD-BP): 59 %
BH CV ECHO MEAS - EF(MOD-SP2): 62.1 %
BH CV ECHO MEAS - EF(MOD-SP4): 70.6 %
BH CV ECHO MEAS - EF(TEICH): 64.1 %
BH CV ECHO MEAS - ESV(MOD-SP2): 22 ML
BH CV ECHO MEAS - ESV(MOD-SP4): 15 ML
BH CV ECHO MEAS - ESV(TEICH): 49.4 ML
BH CV ECHO MEAS - IVS/LVPW: 0.96
BH CV ECHO MEAS - IVSD: 0.96 CM
BH CV ECHO MEAS - LAT PEAK E' VEL: 9 CM/SEC
BH CV ECHO MEAS - LV DIASTOLIC VOL/BSA (35-75): 26.9 ML/M^2
BH CV ECHO MEAS - LV MASS(C)D: 197.4 GRAMS
BH CV ECHO MEAS - LV MASS(C)DI: 104.1 GRAMS/M^2
BH CV ECHO MEAS - LV SYSTOLIC VOL/BSA (12-30): 7.9 ML/M^2
BH CV ECHO MEAS - LVIDD: 5.3 CM
BH CV ECHO MEAS - LVIDS: 3.5 CM
BH CV ECHO MEAS - LVLD AP2: 7.6 CM
BH CV ECHO MEAS - LVLD AP4: 6.7 CM
BH CV ECHO MEAS - LVLS AP2: 5.8 CM
BH CV ECHO MEAS - LVLS AP4: 4.8 CM
BH CV ECHO MEAS - LVPWD: 1 CM
BH CV ECHO MEAS - MED PEAK E' VEL: 8 CM/SEC
BH CV ECHO MEAS - MR MAX PG: 32.8 MMHG
BH CV ECHO MEAS - MR MAX VEL: 286.6 CM/SEC
BH CV ECHO MEAS - MV A DUR: 0.11 SEC
BH CV ECHO MEAS - MV A MAX VEL: 109.4 CM/SEC
BH CV ECHO MEAS - MV DEC SLOPE: 320.7 CM/SEC^2
BH CV ECHO MEAS - MV DEC TIME: 0.22 SEC
BH CV ECHO MEAS - MV E MAX VEL: 73.8 CM/SEC
BH CV ECHO MEAS - MV E/A: 0.67
BH CV ECHO MEAS - MV P1/2T MAX VEL: 71.1 CM/SEC
BH CV ECHO MEAS - MV P1/2T: 64.9 MSEC
BH CV ECHO MEAS - MVA P1/2T LCG: 3.1 CM^2
BH CV ECHO MEAS - MVA(P1/2T): 3.4 CM^2
BH CV ECHO MEAS - PULM A REVS DUR: 0.12 SEC
BH CV ECHO MEAS - PULM A REVS VEL: 22.8 CM/SEC
BH CV ECHO MEAS - PULM DIAS VEL: 32.5 CM/SEC
BH CV ECHO MEAS - PULM S/D: 1.3
BH CV ECHO MEAS - PULM SYS VEL: 41.2 CM/SEC
BH CV ECHO MEAS - SI(CUBED): 58.5 ML/M^2
BH CV ECHO MEAS - SI(MOD-SP2): 19 ML/M^2
BH CV ECHO MEAS - SI(MOD-SP4): 19 ML/M^2
BH CV ECHO MEAS - SI(TEICH): 46.6 ML/M^2
BH CV ECHO MEAS - SV(CUBED): 110.9 ML
BH CV ECHO MEAS - SV(MOD-SP2): 36 ML
BH CV ECHO MEAS - SV(MOD-SP4): 36 ML
BH CV ECHO MEAS - SV(TEICH): 88.3 ML
BH CV ECHO MEAS - TAPSE (>1.6): 2.7 CM2
BH CV ECHO MEAS - TR MAX VEL: 302.8 CM/SEC
BH CV ECHO MEASUREMENTS AVERAGE E/E' RATIO: 8.68
BH CV STRESS BP STAGE 1: NORMAL
BH CV STRESS BP STAGE 2: NORMAL
BH CV STRESS DURATION MIN STAGE 1: 3
BH CV STRESS DURATION MIN STAGE 2: 3
BH CV STRESS DURATION SEC STAGE 1: 0
BH CV STRESS DURATION SEC STAGE 2: 0
BH CV STRESS ECHO POST STRESS EJECTION FRACTION EF: 70 %
BH CV STRESS GRADE STAGE 1: 10
BH CV STRESS GRADE STAGE 2: 12
BH CV STRESS HR STAGE 1: 104
BH CV STRESS HR STAGE 2: 138
BH CV STRESS METS STAGE 1: 5
BH CV STRESS METS STAGE 2: 7.5
BH CV STRESS PROTOCOL 1: NORMAL
BH CV STRESS SPEED STAGE 1: 1.7
BH CV STRESS SPEED STAGE 2: 2.5
BH CV STRESS STAGE 1: 1
BH CV STRESS STAGE 2: 2
BH CV XLRA - RV BASE: 2.5 CM
BH CV XLRA - TDI S': 11 CM/SEC
LEFT ATRIUM VOLUME INDEX: 19 ML/M2
MAXIMAL PREDICTED HEART RATE: 152 BPM
PERCENT MAX PREDICTED HR: 90.79 %
SINUS: 3.3 CM
STJ: 2.6 CM
STRESS BASELINE BP: NORMAL MMHG
STRESS BASELINE HR: 67 BPM
STRESS PERCENT HR: 107 %
STRESS POST ESTIMATED WORKLOAD: 7.5 METS
STRESS POST EXERCISE DUR MIN: 6 MIN
STRESS POST EXERCISE DUR SEC: 0 SEC
STRESS POST PEAK BP: NORMAL MMHG
STRESS POST PEAK HR: 138 BPM
STRESS TARGET HR: 129 BPM

## 2019-07-25 PROCEDURE — 99214 OFFICE O/P EST MOD 30 MIN: CPT | Performed by: INTERNAL MEDICINE

## 2019-07-25 PROCEDURE — 93325 DOPPLER ECHO COLOR FLOW MAPG: CPT

## 2019-07-25 PROCEDURE — 93018 CV STRESS TEST I&R ONLY: CPT | Performed by: INTERNAL MEDICINE

## 2019-07-25 PROCEDURE — 93017 CV STRESS TEST TRACING ONLY: CPT

## 2019-07-25 PROCEDURE — 93320 DOPPLER ECHO COMPLETE: CPT

## 2019-07-25 PROCEDURE — 93350 STRESS TTE ONLY: CPT

## 2019-07-25 PROCEDURE — 93350 STRESS TTE ONLY: CPT | Performed by: INTERNAL MEDICINE

## 2019-07-25 PROCEDURE — 93000 ELECTROCARDIOGRAM COMPLETE: CPT | Performed by: INTERNAL MEDICINE

## 2019-07-25 PROCEDURE — 93325 DOPPLER ECHO COLOR FLOW MAPG: CPT | Performed by: INTERNAL MEDICINE

## 2019-07-25 PROCEDURE — 93016 CV STRESS TEST SUPVJ ONLY: CPT | Performed by: INTERNAL MEDICINE

## 2019-07-25 PROCEDURE — 93320 DOPPLER ECHO COMPLETE: CPT | Performed by: INTERNAL MEDICINE

## 2019-07-25 NOTE — TELEPHONE ENCOUNTER
Ms. Brambila calls in today with sudden onset chest pain. She is a patient of . She has a history of SVT.     She describes it as heavy with radiation to ears and back. She denies SOB, palpitations, headache, dizziness or lightheadedness. She says it has been going on for about 20-25 minutes. She had no aggravating factors she was sitting down when this started.     She does not track her BP at home.     She has taken all meds as prescribed.     Does she need to come in to [facility]?     She can be reached at [phone]    Thanks  MARIA DOLORES

## 2019-07-25 NOTE — PROGRESS NOTES
Date of Office Visit: 2019  Encounter Provider: Sherri Robison MD  Place of Service: [facility] CARDIOLOGY  Patient Name: Kate Brambila  :1950      Patient ID:  Kate Brambila is a 68 y.o. female is here for  followup for chest pain.         History of Present Illness    She had a PET stress study done in  because of some chest pain and had a Holter at  that time for palpitations. This did not show any significant abnormality other than  short runs of nonsustained supraventricular tachycardia. She has known asthma and follows  with Dr. Crowe. She had a severe bronchial infection in 2011 and wound up on a  ventilator for quite some time.       I saw her on 2012, in the chest pain unit. She came in with chest discomfort  which sounded anginal in nature and had a stress echocardiogram done on 2012,  showing no evidence of ischemia, ejection fraction of 64%, grade 1 diastolic dysfunction,  mild mitral insufficiency, and moderate tricuspid insufficiency.     She did have a 2-D echocardiogram  with Doppler done in 2014 that showed ejection fraction of 60%, grade 1 diastolic  dysfunction, mild mitral insufficiency, mild to moderate tricuspid insufficiency, right  ventricular systolic pressure of 24 mmHg, and no pericardial effusion.      She did have a carotid duplex study performed in 2015 which revealed mild bilateral carotid plaquing.       Today with chest pain which is been intermittent for the past 4 days.  She describes as a heaviness across her chest and into her shoulders with settling into her jaw and ears.  The worst episode happened today and lasted for 25 minutes.  She is not doing any exertional activity when this happens.  She is been mildly short winded with that she does feel her heart racing at times.  She had no dizziness or syncope.  She has had 2 nephews with proximal left anterior descending artery  stenosis requiring stenting.       Past Medical History:   Diagnosis Date   • Abdominal bloating     chronic   • Abnormal stool color    • Acid reflux    • Acute bronchitis    • Asthma with allergic rhinitis    • Cataract extraction status of left eye    • Colon cancer screening    • Depression with anxiety    • Elevated alkaline phosphatase measurement    • Esophageal reflux    • Glaucoma    • H/O echocardiogram 09/15/2014   • H/O supraventricular tachycardia     Short runs, nonsustained PSVT per Holter monitor 2008; remains on atenolol   • History of EKG 03/10/2015   • History of Holter monitoring 08/17/2012   • Hyperlipidemia    • Insomnia    • Loose stools     intermittent   • Low back pain radiating to left leg    • Mitral valve insufficiency    • Nausea     chronic   • Nephrolithiasis    • Obesity    • Osteoarthritis    • Pneumonia    • RAD (reactive airway disease)    • Sciatica    • Sleep apnea    • Supraventricular tachycardia (CMS/HCC)    • Tricuspid valve insufficiency          Past Surgical History:   Procedure Laterality Date   • APPENDECTOMY     • BACK SURGERY     • BLADDER SURGERY     • CARPAL TUNNEL RELEASE     • CATARACT EXTRACTION     • COLONOSCOPY  10/08/2015   • FINGER/THUMB ARTHROPLASTY Left    • HYSTERECTOMY     • REPLACEMENT TOTAL KNEE Right    • ROTATOR CUFF REPAIR Bilateral    • TOTAL SHOULDER ARTHROPLASTY Bilateral        Current Outpatient Medications on File Prior to Visit   Medication Sig Dispense Refill   • albuterol (PROAIR HFA) 108 (90 Base) MCG/ACT inhaler Inhale 2 puffs Every 4 (Four) Hours As Needed for Wheezing. 8.5 g 6   • atenolol (TENORMIN) 25 MG tablet Take 1 tablet by mouth Daily. 90 tablet 2   • dicyclomine (BENTYL) 10 MG capsule Take 1 capsule by mouth 4 (Four) Times a Day Before Meals & at Bedtime. 180 capsule 3   • ondansetron (ZOFRAN) 4 MG tablet Take 1 tablet by mouth Every 8 (Eight) Hours As Needed for Nausea or Vomiting. 20 tablet 0   • ranitidine (ZANTAC) 300 MG  capsule Take 1 capsule by mouth Every Evening. 90 capsule 1   • simvastatin (ZOCOR) 20 MG tablet Take 1 tablet by mouth Every Night. 90 tablet 1   • traZODone (DESYREL) 100 MG tablet Take 2 tablets by mouth Every Night. 180 tablet 1   • venlafaxine XR (EFFEXOR XR) 75 MG 24 hr capsule Take 1 capsule by mouth Daily. Take along with Effexor  mg daily. 90 capsule 1   • venlafaxine XR (EFFEXOR-XR) 150 MG 24 hr capsule Take 1 capsule by mouth Daily. 90 capsule 1   • [DISCONTINUED] baclofen (LIORESAL) 10 MG tablet Take 1 tablet by mouth 2 (Two) Times a Day. 180 tablet 3   • [DISCONTINUED] Cholecalciferol (VITAMIN D3) 2000 units capsule Take 2,000 Units by mouth Daily.     • [DISCONTINUED] Scopolamine (TRANSDERM-SCOP, 1.5 MG,) 1.5 MG/3DAYS patch Place 1 patch on the skin as directed by provider Every 72 (Seventy-Two) Hours. 4 each 0     No current facility-administered medications on file prior to visit.        Social History     Socioeconomic History   • Marital status:      Spouse name: Not on file   • Number of children: Not on file   • Years of education: Not on file   • Highest education level: Not on file   Tobacco Use   • Smoking status: Former Smoker     Types: Cigarettes     Last attempt to quit:      Years since quittin.5   • Smokeless tobacco: Never Used   • Tobacco comment: caffeine use   Substance and Sexual Activity   • Alcohol use: Yes     Comment: social use   • Drug use: No           Review of Systems   Constitution: Negative.   HENT: Negative for congestion.    Eyes: Negative for vision loss in left eye and vision loss in right eye.   Respiratory: Negative.  Negative for cough, hemoptysis, shortness of breath, sleep disturbances due to breathing, snoring, sputum production and wheezing.    Endocrine: Negative.    Hematologic/Lymphatic: Negative.    Skin: Negative for poor wound healing and rash.   Musculoskeletal: Negative for falls, gout, muscle cramps and myalgias.  "  Gastrointestinal: Negative for abdominal pain, diarrhea, dysphagia, hematemesis, melena, nausea and vomiting.   Neurological: Negative for excessive daytime sleepiness, dizziness, headaches, light-headedness, loss of balance, seizures and vertigo.   Psychiatric/Behavioral: Negative for depression and substance abuse. The patient is not nervous/anxious.        Procedures    ECG 12 Lead  Date/Time: 7/25/2019 2:12 PM  Performed by: Sherri Robison MD  Authorized by: Sherri Robison MD   Comparison: compared with previous ECG   Similar to previous ECG  Rhythm: sinus rhythm  T inversion: V3, V4 and V5    Clinical impression: abnormal EKG                Objective:      Vitals:    07/25/19 1347   BP: 118/88   BP Location: Left arm   Pulse: 55   Weight: 78 kg (172 lb)   Height: 170.2 cm (67\")     Body mass index is 26.94 kg/m².    Physical Exam   Constitutional: She is oriented to person, place, and time. She appears well-developed and well-nourished. No distress.   HENT:   Head: Normocephalic and atraumatic.   Eyes: Conjunctivae are normal. No scleral icterus.   Neck: Neck supple. No JVD present. Carotid bruit is not present. No thyromegaly present.   Cardiovascular: Normal rate, regular rhythm, S1 normal, S2 normal, normal heart sounds and intact distal pulses.  No extrasystoles are present. PMI is not displaced. Exam reveals no gallop.   No murmur heard.  Pulses:       Carotid pulses are 2+ on the right side, and 2+ on the left side.       Radial pulses are 2+ on the right side, and 2+ on the left side.        Dorsalis pedis pulses are 2+ on the right side, and 2+ on the left side.        Posterior tibial pulses are 2+ on the right side, and 2+ on the left side.   Pulmonary/Chest: Effort normal and breath sounds normal. No respiratory distress. She has no wheezes. She has no rhonchi. She has no rales. She exhibits no tenderness.   Abdominal: Soft. Bowel sounds are normal. She exhibits no distension, no " abdominal bruit and no mass. There is no tenderness.   Musculoskeletal: She exhibits no edema or deformity.   Lymphadenopathy:     She has no cervical adenopathy.   Neurological: She is alert and oriented to person, place, and time. No cranial nerve deficit.   Skin: Skin is warm and dry. No rash noted. She is not diaphoretic. No cyanosis. No pallor. Nails show no clubbing.   Psychiatric: She has a normal mood and affect. Judgment normal.   Vitals reviewed.      Lab Review:       Assessment:      Diagnosis Plan   1. Non-rheumatic mitral regurgitation     2. Non-rheumatic tricuspid valve insufficiency     3. Mixed hyperlipidemia     4. Supraventricular tachycardia (CMS/HCC)     5. Precordial pain  Adult Stress Echo W/ Cont or Stress Agent if Necessary Per Protocol     1. Chest pain, set up stress echo today.   2. Moderate tricuspid insufficiency.   3. Moderate mitral insufficiency.  4. Hyperlipidemia, treated.  5. History of palpitations, on atenolol.  6. History of nonsustained supraventricular tachycardia. Has had more over the past 3 months.   7. BP controlled, dizziness in past due to back problems, not low BP.           Plan:       See veronica in 1 year, no med changes.

## 2019-08-13 RX ORDER — DICYCLOMINE HYDROCHLORIDE 10 MG/1
10 CAPSULE ORAL
Qty: 180 CAPSULE | Refills: 3 | Status: ON HOLD | OUTPATIENT
Start: 2019-08-13 | End: 2021-11-17

## 2019-09-16 RX ORDER — RANITIDINE 300 MG/1
TABLET ORAL
Qty: 90 TABLET | Refills: 1 | Status: SHIPPED | OUTPATIENT
Start: 2019-09-16 | End: 2020-01-09 | Stop reason: ALTCHOICE

## 2019-10-07 RX ORDER — SIMVASTATIN 20 MG
20 TABLET ORAL NIGHTLY
Qty: 90 TABLET | Refills: 1 | Status: SHIPPED | OUTPATIENT
Start: 2019-10-07 | End: 2021-09-01

## 2019-10-07 RX ORDER — TRAZODONE HYDROCHLORIDE 100 MG/1
200 TABLET ORAL NIGHTLY
Qty: 180 TABLET | Refills: 1 | Status: SHIPPED | OUTPATIENT
Start: 2019-10-07

## 2019-10-08 RX ORDER — ATENOLOL 25 MG/1
TABLET ORAL
Qty: 90 TABLET | Refills: 2 | Status: SHIPPED | OUTPATIENT
Start: 2019-10-08 | End: 2020-07-08 | Stop reason: SDUPTHER

## 2019-11-08 RX ORDER — VENLAFAXINE HYDROCHLORIDE 75 MG/1
CAPSULE, EXTENDED RELEASE ORAL
Qty: 90 CAPSULE | Refills: 0 | Status: SHIPPED | OUTPATIENT
Start: 2019-11-08 | End: 2020-02-18

## 2019-12-30 RX ORDER — VENLAFAXINE HYDROCHLORIDE 150 MG/1
CAPSULE, EXTENDED RELEASE ORAL
Qty: 90 CAPSULE | Refills: 1 | Status: SHIPPED | OUTPATIENT
Start: 2019-12-30 | End: 2020-06-26

## 2020-01-09 ENCOUNTER — TELEPHONE (OUTPATIENT)
Dept: FAMILY MEDICINE CLINIC | Facility: CLINIC | Age: 70
End: 2020-01-09

## 2020-01-09 RX ORDER — FAMOTIDINE 40 MG/1
40 TABLET, FILM COATED ORAL DAILY
Qty: 90 TABLET | Refills: 1 | Status: ON HOLD | OUTPATIENT
Start: 2020-01-09 | End: 2021-11-17

## 2020-01-09 NOTE — TELEPHONE ENCOUNTER
Notify Kate Brambila we were notified her Zantac prescription has been recalled.  Need to change to alternate treatment.  Sent in prescription for Pepcid 40 mg daily.  Notify us if any problems with new medications or any concerns.      Daria Hughes, APRN

## 2020-02-18 RX ORDER — VENLAFAXINE HYDROCHLORIDE 75 MG/1
CAPSULE, EXTENDED RELEASE ORAL
Qty: 90 CAPSULE | Refills: 0 | Status: SHIPPED | OUTPATIENT
Start: 2020-02-18 | End: 2020-05-17

## 2020-03-24 RX ORDER — TRAZODONE HYDROCHLORIDE 100 MG/1
TABLET ORAL
Qty: 180 TABLET | Refills: 1 | OUTPATIENT
Start: 2020-03-24

## 2020-03-31 RX ORDER — SIMVASTATIN 20 MG
TABLET ORAL
Qty: 90 TABLET | Refills: 1 | OUTPATIENT
Start: 2020-03-31

## 2020-05-17 RX ORDER — VENLAFAXINE HYDROCHLORIDE 75 MG/1
CAPSULE, EXTENDED RELEASE ORAL
Qty: 90 CAPSULE | Refills: 0 | Status: SHIPPED | OUTPATIENT
Start: 2020-05-17

## 2020-06-18 ENCOUNTER — OFFICE VISIT (OUTPATIENT)
Dept: GASTROENTEROLOGY | Facility: CLINIC | Age: 70
End: 2020-06-18

## 2020-06-18 VITALS
HEIGHT: 67 IN | WEIGHT: 179 LBS | SYSTOLIC BLOOD PRESSURE: 126 MMHG | BODY MASS INDEX: 28.09 KG/M2 | DIASTOLIC BLOOD PRESSURE: 64 MMHG

## 2020-06-18 DIAGNOSIS — Z12.11 ENCOUNTER FOR SCREENING FOR MALIGNANT NEOPLASM OF COLON: ICD-10-CM

## 2020-06-18 DIAGNOSIS — K21.9 GASTROESOPHAGEAL REFLUX DISEASE, ESOPHAGITIS PRESENCE NOT SPECIFIED: Primary | ICD-10-CM

## 2020-06-18 PROCEDURE — 99214 OFFICE O/P EST MOD 30 MIN: CPT | Performed by: INTERNAL MEDICINE

## 2020-06-18 RX ORDER — SODIUM CHLORIDE, SODIUM LACTATE, POTASSIUM CHLORIDE, CALCIUM CHLORIDE 600; 310; 30; 20 MG/100ML; MG/100ML; MG/100ML; MG/100ML
30 INJECTION, SOLUTION INTRAVENOUS CONTINUOUS
Status: CANCELLED | OUTPATIENT
Start: 2020-07-06

## 2020-06-18 NOTE — PROGRESS NOTES
Subjective   Kate Brambila is a 69 y.o.. female is here today for follow-up.    Chief Complaint   Patient presents with   • Heartburn     History of Present Illness  Patient's primary complaint is dysphasia which she is having problems with over the last couple months.  This is classic solid food dysphasia.  She has reflux symptoms but they are not severe.  She underwent colonoscopy attempt in 2015.  The examination was incomplete due to looping.  She has not had screening since as far as I can tell.    The following portions of the patient's history were reviewed and updated as appropriate: allergies, current medications, past family history, past medical history, past social history, past surgical history and problem list.      Current Outpatient Medications:   •  albuterol (PROAIR HFA) 108 (90 Base) MCG/ACT inhaler, Inhale 2 puffs Every 4 (Four) Hours As Needed for Wheezing., Disp: 8.5 g, Rfl: 6  •  atenolol (TENORMIN) 25 MG tablet, take 1 tablet by mouth once daily, Disp: 90 tablet, Rfl: 2  •  dicyclomine (BENTYL) 10 MG capsule, Take 1 capsule by mouth 4 (Four) Times a Day Before Meals & at Bedtime., Disp: 180 capsule, Rfl: 3  •  famotidine (PEPCID) 40 MG tablet, Take 1 tablet by mouth Daily., Disp: 90 tablet, Rfl: 1  •  ondansetron (ZOFRAN) 4 MG tablet, Take 1 tablet by mouth Every 8 (Eight) Hours As Needed for Nausea or Vomiting., Disp: 20 tablet, Rfl: 0  •  simvastatin (ZOCOR) 20 MG tablet, take 1 tablet by mouth every NIGHT, Disp: 90 tablet, Rfl: 1  •  traZODone (DESYREL) 100 MG tablet, take 2 tablets by mouth every NIGHT, Disp: 180 tablet, Rfl: 1  •  venlafaxine XR (EFFEXOR-XR) 150 MG 24 hr capsule, take 1 capsule by mouth once daily, Disp: 90 capsule, Rfl: 1  •  venlafaxine XR (EFFEXOR-XR) 75 MG 24 hr capsule, TAKE ONE CAPSULE BY MOUTH DAILY ALONG WITH 150MG CAPSULE, Disp: 90 capsule, Rfl: 0    Family History   Problem Relation Age of Onset   • Aneurysm Mother         brain   • Hyperlipidemia Mother     • Hypertension Mother    • Cancer Mother         lung   • Alcohol abuse Father    • Other Father         hepatic failure   • Other Son         suicide   • Colon cancer Neg Hx    • Colon polyps Neg Hx        Review of Systems   Respiratory: Negative for shortness of breath.    Cardiovascular: Negative for chest pain.   All other systems reviewed and are negative.      Objective   Physical Exam   Constitutional: She is oriented to person, place, and time. She appears well-developed and well-nourished.   HENT:   Head: Normocephalic and atraumatic.   Right Ear: External ear normal.   Left Ear: External ear normal.   Eyes: Pupils are equal, round, and reactive to light. Conjunctivae and EOM are normal.   Pulmonary/Chest: Effort normal.   Neurological: She is alert and oriented to person, place, and time.   Psychiatric: She has a normal mood and affect. Her behavior is normal. Judgment and thought content normal.   Nursing note and vitals reviewed.      Pertinent laboratory results were reviewed. , Pertinent old records were reviewed.  and Pertinent medical tests were reviewed.     Assessment/Plan   Problems Addressed this Visit        Digestive    Gastroesophageal reflux disease - Primary    Relevant Orders    Case Request (Completed)      Other Visit Diagnoses     Encounter for screening for malignant neoplasm of colon        Relevant Orders    Cologuard - Stool, Per Rectum        Dysphasia.  New problem.  Recommend EGD and dilation.    Needs colorectal cancer screening.  Incomplete colonoscopy 2015.  I think she would be a good candidate for Cologuard and this was ordered as well.

## 2020-06-26 RX ORDER — VENLAFAXINE HYDROCHLORIDE 150 MG/1
CAPSULE, EXTENDED RELEASE ORAL
Qty: 90 CAPSULE | Refills: 0 | Status: SHIPPED | OUTPATIENT
Start: 2020-06-26

## 2020-06-29 ENCOUNTER — TRANSCRIBE ORDERS (OUTPATIENT)
Dept: SLEEP MEDICINE | Facility: HOSPITAL | Age: 70
End: 2020-06-29

## 2020-06-29 DIAGNOSIS — Z01.818 OTHER SPECIFIED PRE-OPERATIVE EXAMINATION: Primary | ICD-10-CM

## 2020-07-08 ENCOUNTER — OFFICE VISIT (OUTPATIENT)
Dept: CARDIOLOGY | Facility: CLINIC | Age: 70
End: 2020-07-08

## 2020-07-08 VITALS
DIASTOLIC BLOOD PRESSURE: 80 MMHG | SYSTOLIC BLOOD PRESSURE: 130 MMHG | HEIGHT: 67 IN | WEIGHT: 183.6 LBS | BODY MASS INDEX: 28.82 KG/M2 | HEART RATE: 69 BPM

## 2020-07-08 DIAGNOSIS — I47.1 SUPRAVENTRICULAR TACHYCARDIA (HCC): Primary | ICD-10-CM

## 2020-07-08 DIAGNOSIS — R94.31 ABNORMAL EKG: ICD-10-CM
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
## 2020-07-08 DIAGNOSIS — I65.23 CAROTID ARTERY PLAQUE, BILATERAL: ICD-10-CM

## 2020-07-08 DIAGNOSIS — I34.0 NONRHEUMATIC MITRAL VALVE REGURGITATION: ICD-10-CM

## 2020-07-08 DIAGNOSIS — I36.1 NONRHEUMATIC TRICUSPID VALVE REGURGITATION: ICD-10-CM
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
## 2020-07-08 PROCEDURE — 99214 OFFICE O/P EST MOD 30 MIN: CPT | Performed by: NURSE PRACTITIONER

## 2020-07-08 PROCEDURE — 93000 ELECTROCARDIOGRAM COMPLETE: CPT | Performed by: NURSE PRACTITIONER

## 2020-07-08 RX ORDER — ATENOLOL 25 MG/1
25 TABLET ORAL NIGHTLY
Qty: 90 TABLET | Refills: 3 | Status: SHIPPED | OUTPATIENT
Start: 2020-07-08 | End: 2021-09-27

## 2020-07-08 NOTE — PROGRESS NOTES
Date of Office Visit: 2020  Encounter Provider: MADISON Mathis  Place of Service: [facility] C[facility]IOLOG[facility]  Patient Name: Kate Brambila  :1950  Primary Cardiologist: Dr. Sherri Robison     Chief Complaint   Patient presents with   • Follow-up   :     Dear Dr. Castellanos,     HPI: Kate Brambila is a 69 y.o. female who presents today for annual cardiac follow up.     She has a known history of asthma, depression, anxiety, GERD, hyperlipidemia, insomnia, and glaucoma.    In , she had chest pain and palpitations.  A cardiac PET scan was normal and Holter monitor showed normal sinus rhythm and short runs of nonsustained supraventricular tachycardia.       In 2012, she reported chest pain and a stress echocardiogram was normal.     In 2014, an echocardiogram showed an EF of 60%, grade 1 diastolic dysfunction, mild mitral insufficiency, mild to moderate tricuspid insufficiency, and no other abnormalities.    In May 2015, carotid duplex showed mild bilateral carotid plaque.    In 2019, she followed up in the office with Dr. Robison.  She reported intermittent chest pain at rest and dyspnea.  On 2019 she had a stress echocardiogram completed with the following results: EF 59%, grade 1 diastolic dysfunction, trace mitral valve regurgitation, mild to moderate tricuspid regurgitation, and mild pulmonary hypertension.  The stress portion was normal showing no evidence of ischemia.    On 7/3/2020, she presented to the [facility] ED for mental status change and tremors after a fall had occurred.  CT scan and MRI of the brain did not show any acute abnormalities.  EEG showed some encephalopathy, but no seizure activity.  She was started on clonazepam by neurology and her symptoms could be from underlying stress and alcohol use.I reviewed her recent blood work from 7/3/2020 which showed the following: CBC normal, CMP normal except for  "chloride of 108 and glucose 130, and magnesium normal.    Today is her follow-up visit.  Her blood pressure and heart rate are both normal.  She denies any further falls since the other night.  Occasionally she will experience a brief palpitation, but never sustained.  She denies chest pain, shortness of air, edema, dizziness, or bleeding.    Past Medical History:   Diagnosis Date   • Acid reflux    • Asthma with allergic rhinitis    • Cataract extraction status of left eye    • Colon cancer screening    • Depression with anxiety    • Elevated alkaline phosphatase measurement    • Esophageal reflux    • Glaucoma    • H/O echocardiogram 09/15/2014   • H/O supraventricular tachycardia     Short runs, nonsustained PSVT per Holter monitor 2008; remains on atenolol   • History of EKG 03/10/2015   • History of Holter monitoring 08/17/2012   • Hyperlipidemia    • Insomnia    • Loose stools     intermittent   • Low back pain radiating to left leg    • Mitral valve insufficiency    • Nausea     chronic   • Nephrolithiasis    • Obesity    • Osteoarthritis    • Pneumonia    • Sciatica    • Sleep apnea     \"MILD\"  NO MACHINE   • Tricuspid valve insufficiency        Past Surgical History:   Procedure Laterality Date   • APPENDECTOMY     • BACK SURGERY      x 2   • BLADDER SURGERY     • CARPAL TUNNEL RELEASE     • CATARACT EXTRACTION     • COLONOSCOPY  10/08/2015    Dr Al Razo   • FINGER/THUMB ARTHROPLASTY Left    • HYSTERECTOMY     • REPLACEMENT TOTAL KNEE Right    • ROTATOR CUFF REPAIR Bilateral     x 2 repair, 2 replacements    • THUMB CARPOMETACARPAL JOINT ARTHROPLASTY FLEXOR CARPI RADIALIS TENDON     • TOTAL SHOULDER ARTHROPLASTY Bilateral    • UPPER GASTROINTESTINAL ENDOSCOPY  10/08/2015    Dr Al Razo       Social History     Social History   • Marital status:      Spouse name: N/A   • Number of children: N/A   • Years of education: N/A     Occupational History   • Not on file.     Social History Main " Topics   • Smoking status: Former Smoker     Types: Cigarettes     Quit date: 1993   • Smokeless tobacco: Never Used      Comment: caffeine use   • Alcohol use Yes      Comment: social use   • Drug use: No   • Sexual activity: Not on file       Family History   Problem Relation Age of Onset   • Aneurysm Mother         brain   • Hyperlipidemia Mother    • Hypertension Mother    • Cancer Mother         lung   • Alcohol abuse Father    • Other Father         hepatic failure   • Other Son         suicide   • Colon cancer Neg Hx    • Colon polyps Neg Hx    • Malig Hyperthermia Neg Hx        Review of Systems   Constitution: Positive for malaise/fatigue and weight gain. Negative for chills, diaphoresis, fever, night sweats and weight loss.   HENT: Negative for hearing loss, nosebleeds, sore throat and tinnitus.    Eyes: Negative for blurred vision, double vision, pain and visual disturbance.   Cardiovascular: Negative for chest pain, claudication, cyanosis, dyspnea on exertion, irregular heartbeat, leg swelling, near-syncope, orthopnea, palpitations, paroxysmal nocturnal dyspnea and syncope.   Respiratory: Negative for cough, hemoptysis, shortness of breath, snoring and wheezing.    Endocrine: Negative for cold intolerance, heat intolerance and polyuria.   Hematologic/Lymphatic: Negative for bleeding problem. Does not bruise/bleed easily.   Skin: Negative for color change, dry skin, flushing and itching.   Musculoskeletal: Positive for joint pain and myalgias. Negative for falls, joint swelling, muscle cramps and muscle weakness.   Gastrointestinal: Negative for abdominal pain, constipation, heartburn, melena, nausea and vomiting.   Genitourinary: Negative for dysuria and hematuria.   Neurological: Positive for paresthesias. Negative for excessive daytime sleepiness, dizziness, light-headedness, loss of balance, numbness, seizures and vertigo.   Psychiatric/Behavioral: Positive for depression. Negative for altered mental  "status, memory loss and substance abuse. The patient does not have insomnia and is not nervous/anxious.    Allergic/Immunologic: Negative for environmental allergies.       Allergies   Allergen Reactions   • Propofol Confusion         Current Outpatient Medications:   •  albuterol (PROAIR HFA) 108 (90 Base) MCG/ACT inhaler, Inhale 2 puffs Every 4 (Four) Hours As Needed for Wheezing., Disp: 8.5 g, Rfl: 6  •  atenolol (TENORMIN) 25 MG tablet, Take 1 tablet by mouth Every Night., Disp: 90 tablet, Rfl: 3  •  dicyclomine (BENTYL) 10 MG capsule, Take 1 capsule by mouth 4 (Four) Times a Day Before Meals & at Bedtime., Disp: 180 capsule, Rfl: 3  •  famotidine (PEPCID) 40 MG tablet, Take 1 tablet by mouth Daily., Disp: 90 tablet, Rfl: 1  •  ibuprofen (ADVIL,MOTRIN) 800 MG tablet, Take 800 mg by mouth Every 6 (Six) Hours As Needed for Mild Pain ., Disp: , Rfl:   •  ondansetron (ZOFRAN) 4 MG tablet, Take 1 tablet by mouth Every 8 (Eight) Hours As Needed for Nausea or Vomiting., Disp: 20 tablet, Rfl: 0  •  simvastatin (ZOCOR) 20 MG tablet, take 1 tablet by mouth every NIGHT, Disp: 90 tablet, Rfl: 1  •  traZODone (DESYREL) 100 MG tablet, take 2 tablets by mouth every NIGHT, Disp: 180 tablet, Rfl: 1  •  venlafaxine XR (EFFEXOR-XR) 150 MG 24 hr capsule, TAKE 1 CAPSULE BY MOUTH ONCE DAILY, Disp: 90 capsule, Rfl: 0  •  venlafaxine XR (EFFEXOR-XR) 75 MG 24 hr capsule, TAKE ONE CAPSULE BY MOUTH DAILY ALONG WITH 150MG CAPSULE, Disp: 90 capsule, Rfl: 0      Objective:     Vitals:    07/08/20 1019 07/08/20 1020   BP: 132/80 130/80   BP Location: Left arm Right arm   Pulse: 69    Weight: 83.3 kg (183 lb 9.6 oz)    Height: 170.2 cm (67\")      Body mass index is 28.76 kg/m².    PHYSICAL EXAM:    Vitals Reviewed.   General Appearance: No acute distress, well developed and well nourished.   Eyes: Conjunctiva and lids: No erythema, swelling, or discharge. Sclera non-icteric.   HENT: Atraumatic, normocephalic. External eyes, ears, and nose " normal. No hearing loss noted. Mucous membranes normal. Lips not cyanotic. Neck supple with no tenderness.  Respiratory: No signs of respiratory distress. Respiration rhythm and depth normal.   Clear to auscultation. No rales, crackles, rhonchi, or wheezing auscultated.   Cardiovascular:  Jugular Venous Pressure: Normal  Heart Rate and Rhythm: Normal, Heart Sounds: Normal S1 and S2. No S3 or S4 noted.  Murmurs: No murmurs noted. No rubs, thrills, or gallops.   Lower Extremities: No edema noted.  Gastrointestinal:  Abdomen soft, non-distended, non-tender. Normal bowel sounds. No hepatomegaly.   Musculoskeletal: Normal movement of extremities  Skin and Nails: General appearance normal. No pallor, cyanosis, diaphoresis. Skin temperature normal. No clubbing of fingernails.   Psychiatric: Patient alert and oriented to person, place, and time. Speech and behavior appropriate. Normal mood and affect.       ECG 12 Lead  Date/Time: 7/8/2020 10:22 AM  Performed by: Clara Lemos APRN  Authorized by: Clara Lemos APRN   Comparison: compared with previous ECG from 7/25/2019  Similar to previous ECG  Rhythm: sinus rhythm  Rate: normal  BPM: 69  Conduction: conduction normal  T inversion: III, aVF, V3, V4, V5 and V6  QRS axis: normal  Other findings: non-specific ST-T wave changes    Clinical impression: abnormal EKG  Comments: Unchanged          Cardiac Procedures:    · 2008: Cardiac PET scan was normal and Holter monitor showed normal sinus rhythm and short runs of nonsustained supraventricular tachycardia.  · August 2012: Normal stress echocardiogram.   · September 2014: Echocardiogram showed an EF of 60%, grade 1 diastolic dysfunction, mild mitral insufficiency, mild to moderate tricuspid insufficiency, and no other abnormalities.    · May 2015: Carotid duplex showed mild bilateral carotid plaque.  · 7/25/2019: Stress echocardiogram completed with the following results: EF 59%, grade 1 diastolic dysfunction, trace  mitral valve regurgitation, mild to moderate tricuspid regurgitation, and mild pulmonary hypertension.        Assessment:       Diagnosis Plan   1. Supraventricular tachycardia (CMS/HCC)     2. Abnormal EKG     3. Nonrheumatic tricuspid valve regurgitation     4. Nonrheumatic mitral valve regurgitation     5. Carotid artery plaque, bilateral     6. Fall in home, initial encounter            Plan:       1.  Supraventricular tachycardia: Noted per Holter monitor in 2008 to have nonsustained short runs of SVT.  Continue current dose of atenolol.  Asked her to call our office if she experiences more frequent episodes of palpitations or longer duration.    2.  Tricuspid Valve Insufficiency: Mild to moderate per echocardiogram September 2014 and 7/2019.  Continue to monitor.    3.  Mitral Valve Insufficiency: Mild per echocardiogram September 2014 and trace per echo 7/2019.  I did not appreciate a heart murmur today.    4.  Hyperlipidemia: She remains on simvastatin.    5.  Carotid Plaque: She would benefit from vascular screening at her convenience.    6.  Follow up with Dr. Sherri Robison in one year, unless otherwise needed sooner.    As always, it has been a pleasure to participate in your patient's care.      Sincerely,         MADISON Hannon        Dictated using Dragon

## 2020-08-12 RX ORDER — VENLAFAXINE HYDROCHLORIDE 75 MG/1
CAPSULE, EXTENDED RELEASE ORAL
Qty: 90 CAPSULE | Refills: 0 | OUTPATIENT
Start: 2020-08-12

## 2020-08-12 NOTE — TELEPHONE ENCOUNTER
Notify pharmacy that patient has changed PCPs.  No longer under [facility] nurse practitioner's care.

## 2020-08-14 RX ORDER — VENLAFAXINE HYDROCHLORIDE 150 MG/1
CAPSULE, EXTENDED RELEASE ORAL
Qty: 90 CAPSULE | Refills: 0 | OUTPATIENT
Start: 2020-08-14

## 2021-01-20 RX ORDER — VENLAFAXINE HYDROCHLORIDE 150 MG/1
CAPSULE, EXTENDED RELEASE ORAL
Qty: 90 CAPSULE | Refills: 0 | OUTPATIENT
Start: 2021-01-20

## 2021-03-09 DIAGNOSIS — Z23 IMMUNIZATION DUE: ICD-10-CM

## 2021-09-01 ENCOUNTER — OFFICE VISIT (OUTPATIENT)
Dept: CARDIOLOGY | Facility: CLINIC | Age: 71
End: 2021-09-01

## 2021-09-01 VITALS
WEIGHT: 182.2 LBS | SYSTOLIC BLOOD PRESSURE: 120 MMHG | HEIGHT: 67 IN | HEART RATE: 75 BPM | BODY MASS INDEX: 28.6 KG/M2 | DIASTOLIC BLOOD PRESSURE: 60 MMHG

## 2021-09-01 DIAGNOSIS — R94.31 ABNORMAL EKG: ICD-10-CM

## 2021-09-01 DIAGNOSIS — I47.1 SUPRAVENTRICULAR TACHYCARDIA (HCC): ICD-10-CM

## 2021-09-01 DIAGNOSIS — E78.2 MIXED HYPERLIPIDEMIA: ICD-10-CM

## 2021-09-01 DIAGNOSIS — I34.0 NONRHEUMATIC MITRAL VALVE REGURGITATION: Primary | ICD-10-CM

## 2021-09-01 PROCEDURE — 93000 ELECTROCARDIOGRAM COMPLETE: CPT | Performed by: INTERNAL MEDICINE

## 2021-09-01 PROCEDURE — 99214 OFFICE O/P EST MOD 30 MIN: CPT | Performed by: INTERNAL MEDICINE

## 2021-09-01 RX ORDER — ATORVASTATIN CALCIUM 20 MG/1
20 TABLET, FILM COATED ORAL NIGHTLY
Qty: 90 TABLET | Refills: 3 | Status: SHIPPED | OUTPATIENT
Start: 2021-09-01 | End: 2022-06-06

## 2021-09-01 RX ORDER — CYCLOBENZAPRINE HCL 10 MG
10 TABLET ORAL 3 TIMES DAILY PRN
COMMUNITY

## 2021-09-01 RX ORDER — HYDROCODONE BITARTRATE AND ACETAMINOPHEN 7.5; 325 MG/1; MG/1
TABLET ORAL 3 TIMES DAILY
COMMUNITY
Start: 2021-08-11

## 2021-09-01 RX ORDER — BUDESONIDE AND FORMOTEROL FUMARATE DIHYDRATE 160; 4.5 UG/1; UG/1
2 AEROSOL RESPIRATORY (INHALATION) 2 TIMES DAILY
COMMUNITY
Start: 2021-08-16 | End: 2022-03-08

## 2021-09-01 NOTE — PROGRESS NOTES
Date of Office Visit: 2021  Encounter Provider: Sherri Robison MD  Place of Service: [facility]  Patient Name: Kate Brambila  :1950      Patient ID:  Kate Barmbila is a 70 y.o. female is here for  followup for hyperlipidemia, history of SVT.        History of Present Illness    She had a PET stress study done in  because of some chest pain and had a Holter at  that time for palpitations. This did not show any significant abnormality other than  short runs of nonsustained supraventricular tachycardia. She has known asthma and follows  with Dr. Crowe. She had a severe bronchial infection in 2011 and wound up on a  ventilator for quite some time.       I saw her on 2012, in the chest pain unit. She came in with chest discomfort  which sounded anginal in nature and had a stress echocardiogram done on 2012,  showing no evidence of ischemia, ejection fraction of 64%, grade 1 diastolic dysfunction,  mild mitral insufficiency, and moderate tricuspid insufficiency.     She did have a 2-D echocardiogram  with Doppler done in 2014 that showed ejection fraction of 60%, grade 1 diastolic  dysfunction, mild mitral insufficiency, mild to moderate tricuspid insufficiency, right  ventricular systolic pressure of 24 mmHg, and no pericardial effusion.      She did have a carotid duplex study performed in 2015 which revealed mild bilateral carotid plaquing.        She has had 2 nephews with proximal left anterior descending artery stenosis requiring stenting.    She has stress echocardiogram done for chest pain 2019 showing resting ejection fraction 59%, normal stress echocardiogram with post exercise ejection fraction of 70%.  This also showed mild to moderate tricuspid insufficiency with grade 1 diastolic dysfunction.  She has had a number of tests at [facility].    She had back surgery 2020 and then developed an  infection afterwards.  This took a while to recover from.  She had an echocardiogram done for bacteremia on 10/15/2020 showed ejection fraction of 60-65% with mild tricuspid insufficiency no evidence of valvular vegetations.  They then decided to do a back stimulator but she developed a severe rash and Tegaderm and that had to be delayed.  The rash happened February 2021.  She was able to stimulator March 2021.  She then fell June 2021 and hit her head.  She did not lose consciousness but it was a severe injury.  She is slightly dizzy and feels like she leans towards the left since that head injury.  She had a high-resolution CT of the chest done 7/15/2021 showing mild subpleural reticulation in the posterior medial lower lobes bilaterally characteristic of chronic interstitial lung disease, small airway air trapping noted and diffuse hepatic steatosis.  She had bilateral carotid duplexes in 6/20/2021 showing less than 50% bilateral carotid artery stenosis and patent vertebral arteries.  She will be seeing Dr. Allen Hines from pulmonary at [facility].    Labs in 6/8/2021 show unremarkable CMP, triglycerides 303, total cholesterol 203, HDL 48, LDL 94, VLDL 61, normal CBC.  She still gets occasional bandlike chest tightness which is nonexertional and usually last couple minutes and then dissipates on zone.  She has mild exertional dyspnea.  She has no orthopnea or PND.  She occasionally feels her heart palpitating and racing slightly but she has had no syncope she does not feel dizzy with this.  Does not last.  She is taking her medications as directed without difficulty.    Labs done 6/8/2021 show potassium 5, otherwise normal CMP, total cholesterol 203, HDL 48, LDL 94, VLDL 61, normal CBC.    Past Medical History:   Diagnosis Date   • Acid reflux    • Asthma with allergic rhinitis    • Cataract extraction status of left eye    • Colon cancer screening    • Depression with anxiety    • Elevated alkaline phosphatase  "measurement    • Esophageal reflux    • Glaucoma    • H/O echocardiogram 09/15/2014   • H/O supraventricular tachycardia     Short runs, nonsustained PSVT per Holter monitor 2008; remains on atenolol   • History of EKG 03/10/2015   • History of Holter monitoring 08/17/2012   • Hyperlipidemia    • Insomnia    • Loose stools     intermittent   • Low back pain radiating to left leg    • Mitral valve insufficiency    • Nausea     chronic   • Nephrolithiasis    • Obesity    • Osteoarthritis    • Pneumonia    • Sciatica    • Sleep apnea     \"MILD\"  NO MACHINE   • Tricuspid valve insufficiency          Past Surgical History:   Procedure Laterality Date   • APPENDECTOMY     • BACK SURGERY      x 2   • BLADDER SURGERY     • CARPAL TUNNEL RELEASE     • CATARACT EXTRACTION     • COLONOSCOPY  10/08/2015    Dr Al Razo   • FINGER/THUMB ARTHROPLASTY Left    • HYSTERECTOMY     • REPLACEMENT TOTAL KNEE Right    • ROTATOR CUFF REPAIR Bilateral     x 2 repair, 2 replacements    • THUMB CARPOMETACARPAL JOINT ARTHROPLASTY FLEXOR CARPI RADIALIS TENDON     • TOTAL SHOULDER ARTHROPLASTY Bilateral    • UPPER GASTROINTESTINAL ENDOSCOPY  10/08/2015    Dr Al Razo       Current Outpatient Medications on File Prior to Visit   Medication Sig Dispense Refill   • albuterol (PROAIR HFA) 108 (90 Base) MCG/ACT inhaler Inhale 2 puffs Every 4 (Four) Hours As Needed for Wheezing. 8.5 g 6   • atenolol (TENORMIN) 25 MG tablet Take 1 tablet by mouth Every Night. 90 tablet 3   • cyclobenzaprine (FLEXERIL) 10 MG tablet Take 10 mg by mouth 3 (Three) Times a Day As Needed for Muscle Spasms.     • dicyclomine (BENTYL) 10 MG capsule Take 1 capsule by mouth 4 (Four) Times a Day Before Meals & at Bedtime. 180 capsule 3   • famotidine (PEPCID) 40 MG tablet Take 1 tablet by mouth Daily. 90 tablet 1   • HYDROcodone-acetaminophen (NORCO) 7.5-325 MG per tablet Take  by mouth 3 (Three) Times a Day.     • ondansetron (ZOFRAN) 4 MG tablet Take 1 tablet by mouth " "Every 8 (Eight) Hours As Needed for Nausea or Vomiting. 20 tablet 0   • Symbicort 160-4.5 MCG/ACT inhaler 2 puffs 2 (Two) Times a Day.     • traZODone (DESYREL) 100 MG tablet take 2 tablets by mouth every NIGHT 180 tablet 1   • venlafaxine XR (EFFEXOR-XR) 150 MG 24 hr capsule TAKE 1 CAPSULE BY MOUTH ONCE DAILY 90 capsule 0   • venlafaxine XR (EFFEXOR-XR) 75 MG 24 hr capsule TAKE ONE CAPSULE BY MOUTH DAILY ALONG WITH 150MG CAPSULE 90 capsule 0   • [DISCONTINUED] simvastatin (ZOCOR) 20 MG tablet take 1 tablet by mouth every NIGHT 90 tablet 1     No current facility-administered medications on file prior to visit.       Social History     Socioeconomic History   • Marital status:      Spouse name: Not on file   • Number of children: Not on file   • Years of education: Not on file   • Highest education level: Not on file   Tobacco Use   • Smoking status: Former Smoker     Types: Cigarettes     Quit date:      Years since quittin.6   • Smokeless tobacco: Never Used   • Tobacco comment: caffeine use: 6 cans diet cokedaily   Substance and Sexual Activity   • Alcohol use: Yes     Comment: social use   • Drug use: No           ROS    Procedures    ECG 12 Lead    Date/Time: 2021 8:18 AM  Performed by: Sherri Robison MD  Authorized by: Sherri Robison MD   Comparison: compared with previous ECG   Similar to previous ECG  Rhythm: sinus rhythm  ST Depression: V6, I, II and aVF  T inversion: V3, V4 and V5    Clinical impression: abnormal EKG                Objective:      Vitals:    21 0754   BP: 120/60   BP Location: Right arm   Pulse: 75   Weight: 82.6 kg (182 lb 3.2 oz)   Height: 170.2 cm (67\")     Body mass index is 28.54 kg/m².    Vitals reviewed.   Constitutional:       General: Not in acute distress.     Appearance: Well-developed. Not diaphoretic.   Eyes:      General: No scleral icterus.     Conjunctiva/sclera: Conjunctivae normal.   HENT:      Head: Normocephalic and atraumatic. "   Neck:      Thyroid: No thyromegaly.      Vascular: No carotid bruit or JVD.      Lymphadenopathy: No cervical adenopathy.   Pulmonary:      Effort: Pulmonary effort is normal. No respiratory distress.      Breath sounds: Normal breath sounds. No wheezing. No rhonchi. No rales.   Chest:      Chest wall: Not tender to palpatation.   Cardiovascular:      Normal rate. Regular rhythm.      Murmurs: There is no murmur.      No gallop.   Pulses:     Intact distal pulses.   Edema:     Peripheral edema absent.   Abdominal:      General: Bowel sounds are normal. There is no distension or abdominal bruit.      Palpations: Abdomen is soft. There is no abdominal mass.      Tenderness: There is no abdominal tenderness.   Musculoskeletal:         General: No deformity.      Extremities: No clubbing present.     Cervical back: Neck supple. Skin:     General: Skin is warm and dry. There is no cyanosis.      Coloration: Skin is not pale.      Findings: No rash.   Neurological:      Mental Status: Alert and oriented to person, place, and time.      Cranial Nerves: No cranial nerve deficit.   Psychiatric:         Judgment: Judgment normal.         Lab Review:       Assessment:      Diagnosis Plan   1. Nonrheumatic mitral valve regurgitation     2. Supraventricular tachycardia (CMS/HCC)     3. Mixed hyperlipidemia     4. Abnormal EKG       1. Chest pain, nonischemic stress echo done 7/2019  2. Mild -moderate tricuspid insufficiency.   3. Mild mitral insufficiency.  4. Hyperlipidemia, treated.  5. History of palpitations, on atenolol.  6. History of nonsustained supraventricular tachycardia.  Stable  7.  Severe back problems, status post back surgery, now has a stimulator for her back.        Plan:       Her VLDL is high, stop simvastatin and start atorvastatin 20 mg nightly.  We will get her CT images from [place] so I can evaluate for coronary artery calcification.  See [facility] in 1 year, no other testing at this time.

## 2021-09-16 ENCOUNTER — TELEPHONE (OUTPATIENT)
Dept: CARDIOLOGY | Facility: CLINIC | Age: 71
End: 2021-09-16

## 2021-09-16 DIAGNOSIS — I25.10 CORONARY ARTERY CALCIFICATION: Primary | ICD-10-CM

## 2021-09-16 DIAGNOSIS — R07.2 PRECORDIAL PAIN: ICD-10-CM

## 2021-09-16 DIAGNOSIS — I25.84 CORONARY ARTERY CALCIFICATION: Primary | ICD-10-CM

## 2021-09-16 NOTE — TELEPHONE ENCOUNTER
I spoke with the pt after reviewing her high-resolution CT which was done 7/14/2021.  Images demonstrate calcification of the distal left main into the proximal LAD with calcification in the proximal LAD and mid LAD.  She has been having chest tightness which occasionally is brought on by activity but many times is random.  I am concerned that this is angina.  I am particularly concerned that she might have anterior wall ischemia.  I am setting her up for a treadmill stress nuclear perfusion study.

## 2021-09-20 ENCOUNTER — HOSPITAL ENCOUNTER (OUTPATIENT)
Dept: CARDIOLOGY | Facility: HOSPITAL | Age: 71
Discharge: HOME | End: 2021-09-20
Admitting: INTERNAL MEDICINE

## 2021-09-20 ENCOUNTER — HOSPITAL ENCOUNTER (OUTPATIENT)
Dept: CARDIOLOGY | Facility: HOSPITAL | Age: 71
End: 2021-09-20

## 2021-09-20 VITALS — HEIGHT: 67 IN | WEIGHT: 182.1 LBS | BODY MASS INDEX: 28.58 KG/M2

## 2021-09-20 DIAGNOSIS — I25.84 CORONARY ARTERY CALCIFICATION: ICD-10-CM

## 2021-09-20 DIAGNOSIS — I25.10 CORONARY ARTERY CALCIFICATION: ICD-10-CM

## 2021-09-20 DIAGNOSIS — R07.2 PRECORDIAL PAIN: ICD-10-CM

## 2021-09-20 LAB
BH CV NUCLEAR PRIOR STUDY: 2
BH CV REST NUCLEAR ISOTOPE DOSE: 46 MCI
BH CV STRESS BP STAGE 1: NORMAL
BH CV STRESS COMMENTS STAGE 1: NORMAL
BH CV STRESS DOSE REGADENOSON STAGE 1: 0.4
BH CV STRESS DURATION MIN STAGE 1: 0
BH CV STRESS DURATION SEC STAGE 1: 10
BH CV STRESS HR STAGE 1: 100
BH CV STRESS NUCLEAR ISOTOPE DOSE: 46 MCI
BH CV STRESS PROTOCOL 1: NORMAL
BH CV STRESS RECOVERY BP: NORMAL MMHG
BH CV STRESS RECOVERY HR: 92 BPM
BH CV STRESS STAGE 1: 1
LV EF NUC BP: 75 %
MAXIMAL PREDICTED HEART RATE: 150 BPM
PERCENT MAX PREDICTED HR: 66.67 %
STRESS BASELINE BP: NORMAL MMHG
STRESS BASELINE HR: 82 BPM
STRESS PERCENT HR: 78 %
STRESS POST EXERCISE DUR SEC: 10 SEC
STRESS POST PEAK BP: NORMAL MMHG
STRESS POST PEAK HR: 100 BPM
STRESS TARGET HR: 128 BPM

## 2021-09-20 PROCEDURE — 78492 MYOCRD IMG PET MLT RST&STRS: CPT | Performed by: INTERNAL MEDICINE

## 2021-09-20 PROCEDURE — 0 RUBIDIUM CHLORIDE: Performed by: INTERNAL MEDICINE

## 2021-09-20 PROCEDURE — 25010000002 REGADENOSON 0.4 MG/5ML SOLUTION: Performed by: INTERNAL MEDICINE

## 2021-09-20 PROCEDURE — 93016 CV STRESS TEST SUPVJ ONLY: CPT | Performed by: INTERNAL MEDICINE

## 2021-09-20 PROCEDURE — 93018 CV STRESS TEST I&R ONLY: CPT | Performed by: INTERNAL MEDICINE

## 2021-09-20 PROCEDURE — 78492 MYOCRD IMG PET MLT RST&STRS: CPT

## 2021-09-20 PROCEDURE — A9555 RB82 RUBIDIUM: HCPCS | Performed by: INTERNAL MEDICINE

## 2021-09-20 PROCEDURE — 93017 CV STRESS TEST TRACING ONLY: CPT

## 2021-09-20 RX ADMIN — REGADENOSON 0.4 MG: 0.08 INJECTION, SOLUTION INTRAVENOUS at 09:33

## 2021-09-21 ENCOUNTER — TELEPHONE (OUTPATIENT)
Dept: CARDIOLOGY | Facility: CLINIC | Age: 71
End: 2021-09-21

## 2021-09-21 RX ORDER — NITROGLYCERIN 0.4 MG/1
TABLET SUBLINGUAL
Qty: 30 TABLET | Refills: 1 | Status: SHIPPED | OUTPATIENT
Start: 2021-09-21

## 2021-09-21 NOTE — TELEPHONE ENCOUNTER
Normal stress - pls call.    Have her see juan in 6 months instead of 1 year - and i'm sending in ntg to use for chest pain - if she is using it - pls have her let us know.     rm

## 2021-09-21 NOTE — TELEPHONE ENCOUNTER
Patient notified of results and recommendations and verbalized understanding    Schedulers please call and move up apt with TR per RM note.  Thanks  Nelly Godwin RN  Triage nurse

## 2021-09-27 RX ORDER — ATENOLOL 25 MG/1
25 TABLET ORAL NIGHTLY
Qty: 90 TABLET | Refills: 3 | Status: SHIPPED | OUTPATIENT
Start: 2021-09-27 | End: 2022-03-08 | Stop reason: SDUPTHER

## 2021-09-28 ENCOUNTER — TELEPHONE (OUTPATIENT)
Dept: CARDIOLOGY | Facility: CLINIC | Age: 71
End: 2021-09-28

## 2021-09-28 NOTE — TELEPHONE ENCOUNTER
Her CT chest showed coronary artery calcium - this is CAD.  It is not causing severe obstruction as her stress study is normal.  This CAD needs to be treated medically with BP and BS control and lipid control.     So CT abnormla, stress normal.      pls call.         ----- Message from Luz Barker MA sent at 9/27/2021 10:28 AM EDT -----  Regarding: FW: Non-Urgent Medical Question  Contact: [phone]    ----- Message -----  From: Kate Brambila  Sent: 9/26/2021   9:46 AM EDT  To: S[facility]  Subject: Non-Urgent Medical Question                      Scan taken in July of my heart showed some problems. A scan taken last week says everything is fine. I am a little confused at this time. Is there a problem with my heart or is everything OK?

## 2021-09-28 NOTE — TELEPHONE ENCOUNTER
I spoke with Ms. Brambila regarding her testing and the difference in the 2 test. She verbalized understaning.    Thank you,  Adamaris Ennis RN  [facility]  Triage

## 2021-11-09 ENCOUNTER — TELEPHONE (OUTPATIENT)
Dept: CARDIOLOGY | Facility: CLINIC | Age: 71
End: 2021-11-09

## 2021-11-09 DIAGNOSIS — I20.0 UNSTABLE ANGINA (HCC): Primary | ICD-10-CM

## 2021-11-09 RX ORDER — ISOSORBIDE MONONITRATE 30 MG/1
30 TABLET, EXTENDED RELEASE ORAL DAILY
Qty: 30 TABLET | Refills: 11 | Status: SHIPPED | OUTPATIENT
Start: 2021-11-09 | End: 2022-03-08

## 2021-11-09 NOTE — TELEPHONE ENCOUNTER
Pt was told to inform you when she took her nitro.    She had to take is a couple of times last week and also today around 10:00 AM.     I spoke to the pt. She is not having CP right now. After she took the second tablet of nitro today her CP went away.    PT#: [phone]

## 2021-11-10 ENCOUNTER — TRANSCRIBE ORDERS (OUTPATIENT)
Dept: CARDIOLOGY | Facility: CLINIC | Age: 71
End: 2021-11-10

## 2021-11-10 DIAGNOSIS — Z13.6 SCREENING FOR ISCHEMIC HEART DISEASE: ICD-10-CM

## 2021-11-10 DIAGNOSIS — Z01.810 PRE-OPERATIVE CARDIOVASCULAR EXAMINATION: ICD-10-CM

## 2021-11-10 DIAGNOSIS — Z01.818 OTHER SPECIFIED PRE-OPERATIVE EXAMINATION: Primary | ICD-10-CM

## 2021-11-10 PROBLEM — I20.0 UNSTABLE ANGINA (HCC): Status: ACTIVE | Noted: 2021-11-10

## 2021-11-11 ENCOUNTER — TRANSCRIBE ORDERS (OUTPATIENT)
Dept: CARDIOLOGY | Facility: CLINIC | Age: 71
End: 2021-11-11

## 2021-11-11 DIAGNOSIS — Z01.818 OTHER SPECIFIED PRE-OPERATIVE EXAMINATION: Primary | ICD-10-CM

## 2021-11-16 ENCOUNTER — LAB (OUTPATIENT)
Dept: LAB | Facility: HOSPITAL | Age: 71
End: 2021-11-16

## 2021-11-16 DIAGNOSIS — Z01.818 OTHER SPECIFIED PRE-OPERATIVE EXAMINATION: ICD-10-CM

## 2021-11-16 DIAGNOSIS — Z13.6 SCREENING FOR ISCHEMIC HEART DISEASE: ICD-10-CM

## 2021-11-16 DIAGNOSIS — Z01.810 PRE-OPERATIVE CARDIOVASCULAR EXAMINATION: ICD-10-CM

## 2021-11-16 LAB
ANION GAP SERPL CALCULATED.3IONS-SCNC: 10.6 MMOL/L (ref 5–15)
BASOPHILS # BLD AUTO: 0.04 10*3/MM3 (ref 0–0.2)
BASOPHILS NFR BLD AUTO: 0.6 % (ref 0–1.5)
BUN SERPL-MCNC: 20 MG/DL (ref 8–23)
BUN/CREAT SERPL: 24.4 (ref 7–25)
CALCIUM SPEC-SCNC: 8.9 MG/DL (ref 8.6–10.5)
CHLORIDE SERPL-SCNC: 101 MMOL/L (ref 98–107)
CO2 SERPL-SCNC: 29.4 MMOL/L (ref 22–29)
CREAT SERPL-MCNC: 0.82 MG/DL (ref 0.57–1)
DEPRECATED RDW RBC AUTO: 44 FL (ref 37–54)
EOSINOPHIL # BLD AUTO: 0.45 10*3/MM3 (ref 0–0.4)
EOSINOPHIL NFR BLD AUTO: 6.5 % (ref 0.3–6.2)
ERYTHROCYTE [DISTWIDTH] IN BLOOD BY AUTOMATED COUNT: 12.4 % (ref 12.3–15.4)
GFR SERPL CREATININE-BSD FRML MDRD: 69 ML/MIN/1.73
GLUCOSE SERPL-MCNC: 94 MG/DL (ref 65–99)
HCT VFR BLD AUTO: 36.8 % (ref 34–46.6)
HGB BLD-MCNC: 12 G/DL (ref 12–15.9)
IMM GRANULOCYTES # BLD AUTO: 0.03 10*3/MM3 (ref 0–0.05)
IMM GRANULOCYTES NFR BLD AUTO: 0.4 % (ref 0–0.5)
LYMPHOCYTES # BLD AUTO: 2.42 10*3/MM3 (ref 0.7–3.1)
LYMPHOCYTES NFR BLD AUTO: 34.8 % (ref 19.6–45.3)
MCH RBC QN AUTO: 31.7 PG (ref 26.6–33)
MCHC RBC AUTO-ENTMCNC: 32.6 G/DL (ref 31.5–35.7)
MCV RBC AUTO: 97.1 FL (ref 79–97)
MONOCYTES # BLD AUTO: 0.74 10*3/MM3 (ref 0.1–0.9)
MONOCYTES NFR BLD AUTO: 10.6 % (ref 5–12)
NEUTROPHILS NFR BLD AUTO: 3.27 10*3/MM3 (ref 1.7–7)
NEUTROPHILS NFR BLD AUTO: 47.1 % (ref 42.7–76)
NRBC BLD AUTO-RTO: 0 /100 WBC (ref 0–0.2)
PLATELET # BLD AUTO: 194 10*3/MM3 (ref 140–450)
PMV BLD AUTO: 10 FL (ref 6–12)
POTASSIUM SERPL-SCNC: 4 MMOL/L (ref 3.5–5.2)
RBC # BLD AUTO: 3.79 10*6/MM3 (ref 3.77–5.28)
SARS-COV-2 ORF1AB RESP QL NAA+PROBE: NOT DETECTED
SODIUM SERPL-SCNC: 141 MMOL/L (ref 136–145)
WBC # BLD AUTO: 6.95 10*3/MM3 (ref 3.4–10.8)

## 2021-11-16 PROCEDURE — C9803 HOPD COVID-19 SPEC COLLECT: HCPCS

## 2021-11-16 PROCEDURE — 36415 COLL VENOUS BLD VENIPUNCTURE: CPT

## 2021-11-16 PROCEDURE — U0005 INFEC AGEN DETEC AMPLI PROBE: HCPCS

## 2021-11-16 PROCEDURE — 80048 BASIC METABOLIC PNL TOTAL CA: CPT

## 2021-11-16 PROCEDURE — U0004 COV-19 TEST NON-CDC HGH THRU: HCPCS

## 2021-11-16 PROCEDURE — 85025 COMPLETE CBC W/AUTO DIFF WBC: CPT

## 2021-11-17 ENCOUNTER — HOSPITAL ENCOUNTER (OUTPATIENT)
Facility: HOSPITAL | Age: 71
Setting detail: HOSPITAL OUTPATIENT SURGERY
Discharge: HOME | End: 2021-11-17
Attending: INTERNAL MEDICINE | Admitting: INTERNAL MEDICINE

## 2021-11-17 VITALS
HEART RATE: 69 BPM | HEIGHT: 67 IN | OXYGEN SATURATION: 94 % | SYSTOLIC BLOOD PRESSURE: 90 MMHG | TEMPERATURE: 97.4 F | DIASTOLIC BLOOD PRESSURE: 47 MMHG | RESPIRATION RATE: 18 BRPM | BODY MASS INDEX: 28.56 KG/M2 | WEIGHT: 182 LBS

## 2021-11-17 DIAGNOSIS — I20.0 UNSTABLE ANGINA (HCC): ICD-10-CM

## 2021-11-17 PROCEDURE — 25010000002 DIPHENHYDRAMINE PER 50 MG: Performed by: INTERNAL MEDICINE

## 2021-11-17 PROCEDURE — 25010000002 FENTANYL CITRATE (PF) 50 MCG/ML SOLUTION: Performed by: INTERNAL MEDICINE

## 2021-11-17 PROCEDURE — 25010000002 MIDAZOLAM PER 1 MG: Performed by: INTERNAL MEDICINE

## 2021-11-17 PROCEDURE — C1894 INTRO/SHEATH, NON-LASER: HCPCS | Performed by: INTERNAL MEDICINE

## 2021-11-17 PROCEDURE — S0260 H&P FOR SURGERY: HCPCS | Performed by: INTERNAL MEDICINE

## 2021-11-17 PROCEDURE — 0 IOPAMIDOL PER 1 ML: Performed by: INTERNAL MEDICINE

## 2021-11-17 PROCEDURE — C1769 GUIDE WIRE: HCPCS | Performed by: INTERNAL MEDICINE

## 2021-11-17 PROCEDURE — 25010000002 HEPARIN (PORCINE) PER 1000 UNITS: Performed by: INTERNAL MEDICINE

## 2021-11-17 PROCEDURE — 93458 L HRT ARTERY/VENTRICLE ANGIO: CPT | Performed by: INTERNAL MEDICINE

## 2021-11-17 RX ORDER — LIDOCAINE HYDROCHLORIDE 20 MG/ML
INJECTION, SOLUTION INFILTRATION; PERINEURAL AS NEEDED
Status: DISCONTINUED | OUTPATIENT
Start: 2021-11-17 | End: 2021-11-17 | Stop reason: HOSPADM

## 2021-11-17 RX ORDER — AMOXICILLIN 250 MG
1 CAPSULE ORAL DAILY
COMMUNITY

## 2021-11-17 RX ORDER — SODIUM CHLORIDE 0.9 % (FLUSH) 0.9 %
10 SYRINGE (ML) INJECTION AS NEEDED
Status: DISCONTINUED | OUTPATIENT
Start: 2021-11-17 | End: 2021-11-17 | Stop reason: HOSPADM

## 2021-11-17 RX ORDER — HYDROXYZINE HYDROCHLORIDE 10 MG/1
10 TABLET, FILM COATED ORAL 3 TIMES DAILY
COMMUNITY

## 2021-11-17 RX ORDER — SODIUM CHLORIDE 9 MG/ML
75 INJECTION, SOLUTION INTRAVENOUS CONTINUOUS
Status: DISCONTINUED | OUTPATIENT
Start: 2021-11-17 | End: 2021-11-17 | Stop reason: HOSPADM

## 2021-11-17 RX ORDER — ACETAMINOPHEN 325 MG/1
650 TABLET ORAL EVERY 4 HOURS PRN
Status: DISCONTINUED | OUTPATIENT
Start: 2021-11-17 | End: 2021-11-17 | Stop reason: HOSPADM

## 2021-11-17 RX ORDER — SODIUM CHLORIDE 0.9 % (FLUSH) 0.9 %
10 SYRINGE (ML) INJECTION EVERY 12 HOURS SCHEDULED
Status: DISCONTINUED | OUTPATIENT
Start: 2021-11-17 | End: 2021-11-17 | Stop reason: HOSPADM

## 2021-11-17 RX ORDER — DIPHENHYDRAMINE HYDROCHLORIDE 50 MG/ML
INJECTION INTRAMUSCULAR; INTRAVENOUS AS NEEDED
Status: DISCONTINUED | OUTPATIENT
Start: 2021-11-17 | End: 2021-11-17 | Stop reason: HOSPADM

## 2021-11-17 RX ORDER — FENTANYL CITRATE 50 UG/ML
INJECTION, SOLUTION INTRAMUSCULAR; INTRAVENOUS AS NEEDED
Status: DISCONTINUED | OUTPATIENT
Start: 2021-11-17 | End: 2021-11-17 | Stop reason: HOSPADM

## 2021-11-17 RX ORDER — OMEPRAZOLE 40 MG/1
40 CAPSULE, DELAYED RELEASE ORAL DAILY
COMMUNITY

## 2021-11-17 RX ORDER — MIDAZOLAM HYDROCHLORIDE 1 MG/ML
INJECTION INTRAMUSCULAR; INTRAVENOUS AS NEEDED
Status: DISCONTINUED | OUTPATIENT
Start: 2021-11-17 | End: 2021-11-17 | Stop reason: HOSPADM

## 2021-11-17 RX ORDER — SODIUM CHLORIDE 0.9 % (FLUSH) 0.9 %
3 SYRINGE (ML) INJECTION EVERY 12 HOURS SCHEDULED
Status: DISCONTINUED | OUTPATIENT
Start: 2021-11-17 | End: 2021-11-17 | Stop reason: HOSPADM

## 2021-11-17 RX ADMIN — SODIUM CHLORIDE 75 ML/HR: 9 INJECTION, SOLUTION INTRAVENOUS at 08:32

## 2021-11-17 NOTE — DISCHARGE INSTRUCTIONS
[facility]  [address] [place] [address]    Coronary Angiogram (Radial/Ulnar Approach) After Care    Refer to this sheet in the next few weeks. These instructions provide you with information on caring for yourself after your procedure. Your caregiver may also give you more specific instructions. Your treatment has been planned according to current medical practices, but problems sometimes occur. Call your caregiver if you have any problems or questions after your procedure.    Home Care Instructions:  · You may shower the day after the procedure. Remove the bandage (dressing) and gently wash the site with plain soap and water. Gently pat the site dry. You may apply a band aid daily for 2 days if desired.    · Do not apply powder or lotion to the site.  · Do not submerge the affected site in water for 3 to 5 days or until the site is completely healed.   · Do not lift, push or pull anything over 5 pounds for 5 days after your procedure or as directed by your physician.  As a reference, a gallon of milk weighs 8 pounds.   · Inspect the site at least twice daily. You may notice some bruising at the site and it may be tender for 1 to 2 weeks.     · Increase your fluid intake for the next 2 days.    · Keep arm elevated for 24 hours. For the remainder of the day, keep your arm in “Pledge of Allegiance” position when up and about.     · You may drive 24 hours after the procedure unless otherwise instructed by your caregiver.  · Do not operate machinery or power tools for 24 hours.  · A responsible adult should be with you for the first 24 hours after you arrive home. Do not make any important legal decisions or sign legal papers for 24 hours.  Do not drink alcohol for 24 hours.    · Metformin or any medications containing Metformin should not be taken for 48 hours after your procedure.      Call Your Doctor if:   · You have unusual pain at the radial/ulnar (wrist) site.  · You have redness, warmth,  swelling, or pain at the radial/ulnar (wrist) site.  · You have drainage (other than a small amount of blood on the dressing).  · `You have chills or a fever > 101.  · Your arm becomes pale or dark, cool, tingly, or numb.  · You develop chest pain, shortness of breath, feel faint or pass out.    · You have heavy bleeding from the site, hold pressure on the site for 20 minutes.  If the bleeding stops, apply a fresh bandage and call your cardiologist.  However, if you        continue to have bleeding, call 911 and continue to apply pressure to the site.   · You have any symptoms of a stroke.  Remember BE FAST  · B-balance. Sudden trouble walking or loss of balance.  · E-eyes.  Sudden changes in how you see or a sudden onset of a very bad headache.   · F-face. Sudden weakness or loss of feeling of the face or facial droop on one side.   · A-arms Sudden weakness or numbness in one arm.  One arm drifts down if they are both held out in front of you. This happens suddenly and usually on one side of the body.   · S-speech.  Sudden trouble speaking, slurred speech or trouble understanding what are saying.   · T-time  Time to call emergency services.  Write down the symptoms and the time they started.

## 2021-11-17 NOTE — H&P
[facility] History And Physical Assessment    Patient Name: Kate Brambila  Age/Sex: 70 y.o. female  : 1950  MRN: [id number]    Date of Hospital Admission: 2021  Date of Encounter Visit: 21  Encounter Provider: Akila Rodriguez MD  Place of Service: [facility]  Patient Care Team:  Velasquez Castellanos MD as PCP - General (Family Medicine)  Sherri Robison MD as Consulting Physician (Cardiology)  Mery Valle APRN as Nurse Practitioner (Nurse Practitioner)  Manuel Cadena MD as Consulting Physician (Ophthalmology)    Subjective:     Chief Complaint:  Chest tightness, coronary artery calcification    History of Present Illness:  See HPI from 2021.  Since then she has complained of chest tightness at rest and with exertion.  This is relieved with sublingual nitroglycerin.  Hi-res CT from 2021 with calcification of distal left main to proximal LAD and proximal LAD to mid LAD.  Stress test on  with no ischemia.  Continued to have discomfort and was started on isosorbide mononitrate.  She denies any further symptoms since starting isosorbide.  She is here for cardiac catheterization today.     HPI from 2021:    She had a PET stress study done in  because of some chest pain and had a Holter at  that time for palpitations. This did not show any significant abnormality other than  short runs of nonsustained supraventricular tachycardia. She has known asthma and follows  with Dr. Crowe. She had a severe bronchial infection in 2011 and wound up on a  ventilator for quite some time.       I saw her on 2012, in the chest pain unit. She came in with chest discomfort  which sounded anginal in nature and had a stress echocardiogram done on 2012,  showing no evidence of ischemia, ejection fraction of 64%, grade 1 diastolic dysfunction,  mild mitral insufficiency, and moderate  tricuspid insufficiency.     She did have a 2-D echocardiogram  with Doppler done in 09/2014 that showed ejection fraction of 60%, grade 1 diastolic  dysfunction, mild mitral insufficiency, mild to moderate tricuspid insufficiency, right  ventricular systolic pressure of 24 mmHg, and no pericardial effusion.      She did have a carotid duplex study performed in 05/2015 which revealed mild bilateral carotid plaquing.        She has had 2 nephews with proximal left anterior descending artery stenosis requiring stenting.     She has stress echocardiogram done for chest pain 7/25/2019 showing resting ejection fraction 59%, normal stress echocardiogram with post exercise ejection fraction of 70%.  This also showed mild to moderate tricuspid insufficiency with grade 1 diastolic dysfunction.  She has had a number of tests at [facility].    She had back surgery October 2020 and then developed an infection afterwards.  This took a while to recover from.  She had an echocardiogram done for bacteremia on 10/15/2020 showed ejection fraction of 60-65% with mild tricuspid insufficiency no evidence of valvular vegetations.  They then decided to do a back stimulator but she developed a severe rash and Tegaderm and that had to be delayed.  The rash happened February 2021.  She was able to stimulator March 2021.  She then fell June 2021 and hit her head.  She did not lose consciousness but it was a severe injury.  She is slightly dizzy and feels like she leans towards the left since that head injury.  She had a high-resolution CT of the chest done 7/15/2021 showing mild subpleural reticulation in the posterior medial lower lobes bilaterally characteristic of chronic interstitial lung disease, small airway air trapping noted and diffuse hepatic steatosis.  She had bilateral carotid duplexes in 6/20/2021 showing less than 50% bilateral carotid artery stenosis and patent vertebral arteries.  She will be seeing Dr. Allen Hines from  "pulmonary at [facility].     Labs in 6/8/2021 show unremarkable CMP, triglycerides 303, total cholesterol 203, HDL 48, LDL 94, VLDL 61, normal CBC.  She still gets occasional bandlike chest tightness which is nonexertional and usually last couple minutes and then dissipates on zone.  She has mild exertional dyspnea.  She has no orthopnea or PND.  She occasionally feels her heart palpitating and racing slightly but she has had no syncope she does not feel dizzy with this.  Does not last.  She is taking her medications as directed without difficulty.     Labs done 6/8/2021 show potassium 5, otherwise normal CMP, total cholesterol 203, HDL 48, LDL 94, VLDL 61, normal CBC.    Past Medical History:  Past Medical History:   Diagnosis Date   • Acid reflux    • Asthma with allergic rhinitis    • Cataract extraction status of left eye    • Colon cancer screening    • Depression with anxiety    • Elevated alkaline phosphatase measurement    • Esophageal reflux    • Glaucoma    • H/O echocardiogram 09/15/2014   • H/O supraventricular tachycardia     Short runs, nonsustained PSVT per Holter monitor 2008; remains on atenolol   • History of EKG 03/10/2015   • History of Holter monitoring 08/17/2012   • Hyperlipidemia    • Insomnia    • Loose stools     intermittent   • Low back pain radiating to left leg    • Mitral valve insufficiency    • Nausea     chronic   • Nephrolithiasis    • Obesity    • Osteoarthritis    • Pneumonia    • Sciatica    • Sleep apnea     \"MILD\"  NO MACHINE   • Tricuspid valve insufficiency        Past Surgical History:   Procedure Laterality Date   • APPENDECTOMY     • BACK SURGERY      x 3   • BLADDER SURGERY     • CARPAL TUNNEL RELEASE Right    • CATARACT EXTRACTION     • COLONOSCOPY  10/08/2015    Dr Al Razo   • FINGER/THUMB ARTHROPLASTY Left    • HYSTERECTOMY     • REPLACEMENT TOTAL KNEE Right     x2   • ROTATOR CUFF REPAIR Bilateral     x 2 repair, 2 replacements    • THUMB CARPOMETACARPAL JOINT " ARTHROPLASTY FLEXOR CARPI RADIALIS TENDON     • TOTAL SHOULDER ARTHROPLASTY Bilateral     with reversal   • UPPER GASTROINTESTINAL ENDOSCOPY  10/08/2015    Dr Al Razo       Home Medications:   Medications Prior to Admission   Medication Sig Dispense Refill Last Dose   • albuterol (PROAIR HFA) 108 (90 Base) MCG/ACT inhaler Inhale 2 puffs Every 4 (Four) Hours As Needed for Wheezing. 8.5 g 6 11/16/2021 at Unknown time   • atenolol (TENORMIN) 25 MG tablet TAKE 1 TABLET BY MOUTH EVERY NIGHT 90 tablet 3 11/16/2021 at Unknown time   • atorvastatin (LIPITOR) 20 MG tablet Take 1 tablet by mouth Every Night. 90 tablet 3 11/16/2021 at Unknown time   • cyclobenzaprine (FLEXERIL) 10 MG tablet Take 10 mg by mouth 3 (Three) Times a Day As Needed for Muscle Spasms.   Past Month at Unknown time   • HYDROcodone-acetaminophen (NORCO) 7.5-325 MG per tablet Take  by mouth 3 (Three) Times a Day.   11/16/2021 at Unknown time   • hydrOXYzine (ATARAX) 10 MG tablet Take 10 mg by mouth 3 (Three) Times a Day.   11/17/2021 at Unknown time   • isosorbide mononitrate (IMDUR) 30 MG 24 hr tablet Take 1 tablet by mouth Daily. 30 tablet 11 11/17/2021 at Unknown time   • nitroglycerin (NITROSTAT) 0.4 MG SL tablet 1 under the tongue as needed for angina, may repeat q5mins for up three doses 30 tablet 1 Past Month at Unknown time   • omeprazole (priLOSEC) 40 MG capsule Take 40 mg by mouth Daily.   11/17/2021 at Unknown time   • ondansetron (ZOFRAN) 4 MG tablet Take 1 tablet by mouth Every 8 (Eight) Hours As Needed for Nausea or Vomiting. 20 tablet 0 Past Month at Unknown time   • sennosides-docusate (senna-docusate sodium) 8.6-50 MG per tablet Take 1 tablet by mouth Daily.   11/16/2021 at Unknown time   • Symbicort 160-4.5 MCG/ACT inhaler 2 puffs 2 (Two) Times a Day.   11/17/2021 at Unknown time   • traZODone (DESYREL) 100 MG tablet take 2 tablets by mouth every NIGHT 180 tablet 1 11/16/2021 at Unknown time   • venlafaxine XR (EFFEXOR-XR) 150 MG  24 hr capsule TAKE 1 CAPSULE BY MOUTH ONCE DAILY 90 capsule 0 2021 at Unknown time   • venlafaxine XR (EFFEXOR-XR) 75 MG 24 hr capsule TAKE ONE CAPSULE BY MOUTH DAILY ALONG WITH 150MG CAPSULE 90 capsule 0 2021 at Unknown time       Allergies:  Allergies   Allergen Reactions   • Propofol Confusion       Past Social History:  Social History     Socioeconomic History   • Marital status:    Tobacco Use   • Smoking status: Former Smoker     Types: Cigarettes     Quit date:      Years since quittin.8   • Smokeless tobacco: Never Used   • Tobacco comment: caffeine use: 6 cans diet cokedaily   Substance and Sexual Activity   • Alcohol use: Yes     Comment: social use   • Drug use: No       Past Family History:  Family History   Problem Relation Age of Onset   • Aneurysm Mother         brain   • Hyperlipidemia Mother    • Hypertension Mother    • Cancer Mother         lung   • Alcohol abuse Father    • Other Father         hepatic failure   • Other Son         suicide   • Colon cancer Neg Hx    • Colon polyps Neg Hx    • Malig Hyperthermia Neg Hx        Review of Systems:   All systems reviewed. Pertinent positives identified in HPI. All other systems are negative.    Objective:   Temp:  [97.4 °F (36.3 °C)] 97.4 °F (36.3 °C)  Heart Rate:  [77] 77  Resp:  [20] 20  BP: (146)/(91) 146/91  No intake or output data in the 24 hours ending 21 0914  Body mass index is 28.51 kg/m².      21  0828   Weight: 82.6 kg (182 lb)     Weight change:     Physical Exam:   General Appearance:    Alert, cooperative, in no acute distress   Head:    Normocephalic, without obvious abnormality, atraumatic   Eyes:            Lids and lashes normal, conjunctivae and sclerae normal, no   icterus, no pallor, corneas clear, PERRLA   Ears:    Ears appear intact with no abnormalities noted   Neck:   No adenopathy, supple, trachea midline, no thyromegaly, no   carotid bruit, no JVD   Lungs:     Clear to  auscultation,respirations regular, even and unlabored    Heart:    Regular rhythm and normal rate, normal S1 and S2, no murmur, no gallop, no rub, no click   Chest Wall:    No abnormalities observed   Abdomen:     Normal bowel sounds, no masses, no organomegaly, soft        non-tender, non-distended, no guarding, no rebound  tenderness   Extremities:   Moves all extremities well, no edema, no cyanosis, no redness   Pulses:   Pulses palpable and equal bilaterally. Normal radial, carotid, femoral, dorsalis pedis and posterior tibial pulses bilaterally. Normal abdominal aorta   Skin:  Psychiatric:   No bleeding, bruising or rash    Alert and oriented x 3, normal mood and affect         Lab Review:   Results from last 7 days   Lab Units 11/16/21  0731   SODIUM mmol/L 141   POTASSIUM mmol/L 4.0   CHLORIDE mmol/L 101   CO2 mmol/L 29.4*   BUN mg/dL 20   CREATININE mg/dL 0.82   GLUCOSE mg/dL 94   CALCIUM mg/dL 8.9         Results from last 7 days   Lab Units 11/16/21  0731   WBC 10*3/mm3 6.95   HEMOGLOBIN g/dL 12.0   HEMATOCRIT % 36.8   PLATELETS 10*3/mm3 194     I personally viewed and interpreted the patient's EKG    Assessment/Plan:     1. Unstable angina.  Stress test without ischemia.  Hi-res CT from 7/2021 with calcification of distal left main to proximal LAD and proximal LAD to mid LAD.  She continues to have chest tightness both at rest and with exertion that is relieved with sublingual nitroglycerin and has improved since starting isosorbide mononitrate.    2. Paroxsymal SVT  3. Hyperlipidemia.     - For cardiac catheterization today.     Akila Rodriguez MD  11/17/21  09:14 EST

## 2021-11-18 ENCOUNTER — TELEPHONE (OUTPATIENT)
Dept: CARDIOLOGY | Facility: CLINIC | Age: 71
End: 2021-11-18

## 2021-11-18 NOTE — TELEPHONE ENCOUNTER
To whom it may concern:    Kate Brambila is followed in our office for cardiac risks.  She had a normal stress nuclear perfusion study done 9/20/2021.  She had a cardiac catheterization done 11/17/2021 which showed mild coronary artery disease which is treated medically.  She has overall been stable and may proceed with having her EGD and dilation performed with Dr. Orourke.    Please are free to contact me should she have any questions.    Sincerely      Sherri Robison MD        [facility] called to get a cardiac clearance for this patient.  She had a cardiac cath done yesterday and is scheduled to have an EGD with dilation with Dr. Xavier Orourke on 11/30/21.  You can fax that clearance to [phone]    Thanks

## 2022-03-08 ENCOUNTER — OFFICE VISIT (OUTPATIENT)
Dept: CARDIOLOGY | Facility: CLINIC | Age: 72
End: 2022-03-08

## 2022-03-08 VITALS
DIASTOLIC BLOOD PRESSURE: 78 MMHG | WEIGHT: 185 LBS | HEIGHT: 67 IN | HEART RATE: 64 BPM | BODY MASS INDEX: 29.03 KG/M2 | SYSTOLIC BLOOD PRESSURE: 108 MMHG

## 2022-03-08 DIAGNOSIS — I36.1 NONRHEUMATIC TRICUSPID VALVE REGURGITATION: ICD-10-CM

## 2022-03-08 DIAGNOSIS — E78.2 MIXED HYPERLIPIDEMIA: ICD-10-CM

## 2022-03-08 DIAGNOSIS — I65.23 CAROTID ARTERY STENOSIS, ASYMPTOMATIC, BILATERAL: ICD-10-CM

## 2022-03-08 DIAGNOSIS — I25.10 CORONARY ARTERY DISEASE INVOLVING NATIVE CORONARY ARTERY OF NATIVE HEART WITHOUT ANGINA PECTORIS: Primary | ICD-10-CM

## 2022-03-08 DIAGNOSIS — I34.0 NONRHEUMATIC MITRAL VALVE REGURGITATION: ICD-10-CM

## 2022-03-08 DIAGNOSIS — I47.1 SUPRAVENTRICULAR TACHYCARDIA: ICD-10-CM

## 2022-03-08 PROBLEM — F43.10 PTSD (POST-TRAUMATIC STRESS DISORDER): Status: ACTIVE | Noted: 2020-07-09

## 2022-03-08 PROBLEM — Z01.818 PRE-OP EXAM: Status: ACTIVE | Noted: 2021-11-16

## 2022-03-08 PROBLEM — J45.30 MILD PERSISTENT ASTHMA: Status: RESOLVED | Noted: 2021-09-08 | Resolved: 2022-03-08

## 2022-03-08 PROBLEM — Z96.651 HISTORY OF KNEE REPLACEMENT, TOTAL, RIGHT: Status: RESOLVED | Noted: 2019-09-18 | Resolved: 2022-03-08

## 2022-03-08 PROBLEM — L02.519 ABSCESS OF THUMB: Status: ACTIVE | Noted: 2021-12-07

## 2022-03-08 PROBLEM — F43.10 PTSD (POST-TRAUMATIC STRESS DISORDER): Status: RESOLVED | Noted: 2020-07-09 | Resolved: 2022-03-08

## 2022-03-08 PROBLEM — T81.40XA POST OP INFECTION: Status: RESOLVED | Noted: 2021-11-22 | Resolved: 2022-03-08

## 2022-03-08 PROBLEM — L08.9 INFECTION OF THUMB: Status: RESOLVED | Noted: 2021-11-22 | Resolved: 2022-03-08

## 2022-03-08 PROBLEM — R06.02 SHORTNESS OF BREATH: Status: ACTIVE | Noted: 2021-09-08

## 2022-03-08 PROBLEM — Z01.818 PRE-OP EXAM: Status: RESOLVED | Noted: 2021-11-16 | Resolved: 2022-03-08

## 2022-03-08 PROBLEM — G06.1 SPINAL EPIDURAL ABSCESS: Status: RESOLVED | Noted: 2020-11-16 | Resolved: 2022-03-08

## 2022-03-08 PROBLEM — T81.40XA POST OP INFECTION: Status: ACTIVE | Noted: 2021-11-22

## 2022-03-08 PROBLEM — M96.1 FAILED BACK SURGICAL SYNDROME: Status: ACTIVE | Noted: 2021-02-12

## 2022-03-08 PROBLEM — M19.049 CMC ARTHRITIS: Status: ACTIVE | Noted: 2018-04-04

## 2022-03-08 PROBLEM — L02.519 ABSCESS OF THUMB: Status: RESOLVED | Noted: 2021-12-07 | Resolved: 2022-03-08

## 2022-03-08 PROBLEM — J84.10 PULMONARY FIBROSIS (HCC): Status: RESOLVED | Noted: 2021-09-08 | Resolved: 2022-03-08

## 2022-03-08 PROBLEM — R06.02 SHORTNESS OF BREATH: Status: RESOLVED | Noted: 2021-09-08 | Resolved: 2022-03-08
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
## 2022-03-08 PROBLEM — R13.10 SWALLOWING DIFFICULTY: Status: RESOLVED | Noted: 2021-10-11 | Resolved: 2022-03-08

## 2022-03-08 PROBLEM — M65.30 TRIGGER FINGER: Status: RESOLVED | Noted: 2019-02-06 | Resolved: 2022-03-08

## 2022-03-08 PROBLEM — R94.31 ABNORMAL EKG: Status: RESOLVED | Noted: 2020-07-08 | Resolved: 2022-03-08

## 2022-03-08 PROBLEM — Z87.891 EX-SMOKER: Status: ACTIVE | Noted: 2021-09-08

## 2022-03-08 PROBLEM — M48.062 LUMBAR STENOSIS WITH NEUROGENIC CLAUDICATION: Status: RESOLVED | Noted: 2020-09-18 | Resolved: 2022-03-08

## 2022-03-08 PROBLEM — Z87.891 EX-SMOKER: Status: RESOLVED | Noted: 2021-09-08 | Resolved: 2022-03-08

## 2022-03-08 PROBLEM — J84.10 PULMONARY FIBROSIS (HCC): Status: ACTIVE | Noted: 2021-09-08
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
## 2022-03-08 PROBLEM — R13.10 SWALLOWING DIFFICULTY: Status: ACTIVE | Noted: 2021-10-11

## 2022-03-08 PROBLEM — L08.9 INFECTION OF THUMB: Status: ACTIVE | Noted: 2021-11-22

## 2022-03-08 PROBLEM — E55.9 VITAMIN D INSUFFICIENCY: Status: RESOLVED | Noted: 2017-07-14 | Resolved: 2022-03-08

## 2022-03-08 PROBLEM — Z96.651 HISTORY OF KNEE REPLACEMENT, TOTAL, RIGHT: Status: ACTIVE | Noted: 2019-09-18

## 2022-03-08 PROBLEM — M65.30 TRIGGER FINGER: Status: ACTIVE | Noted: 2019-02-06

## 2022-03-08 PROBLEM — J98.4 CHRONIC LUNG DISEASE: Status: ACTIVE | Noted: 2021-07-18

## 2022-03-08 PROBLEM — F41.9 ANXIETY: Status: ACTIVE | Noted: 2021-12-10

## 2022-03-08 PROBLEM — J98.4 CHRONIC LUNG DISEASE: Status: RESOLVED | Noted: 2021-07-18 | Resolved: 2022-03-08

## 2022-03-08 PROBLEM — I20.0 UNSTABLE ANGINA (HCC): Status: RESOLVED | Noted: 2021-11-10 | Resolved: 2022-03-08

## 2022-03-08 PROBLEM — G06.1 SPINAL EPIDURAL ABSCESS: Status: ACTIVE | Noted: 2020-11-16

## 2022-03-08 PROBLEM — M19.049 CMC ARTHRITIS: Status: RESOLVED | Noted: 2018-04-04 | Resolved: 2022-03-08

## 2022-03-08 PROBLEM — F41.9 ANXIETY: Status: RESOLVED | Noted: 2021-12-10 | Resolved: 2022-03-08

## 2022-03-08 PROBLEM — M96.1 FAILED BACK SURGICAL SYNDROME: Status: RESOLVED | Noted: 2021-02-12 | Resolved: 2022-03-08

## 2022-03-08 PROBLEM — M48.062 LUMBAR STENOSIS WITH NEUROGENIC CLAUDICATION: Status: ACTIVE | Noted: 2020-09-18

## 2022-03-08 PROBLEM — J45.30 MILD PERSISTENT ASTHMA: Status: ACTIVE | Noted: 2021-09-08

## 2022-03-08 PROCEDURE — 93000 ELECTROCARDIOGRAM COMPLETE: CPT | Performed by: NURSE PRACTITIONER

## 2022-03-08 PROCEDURE — 99214 OFFICE O/P EST MOD 30 MIN: CPT | Performed by: NURSE PRACTITIONER

## 2022-03-08 RX ORDER — ATENOLOL 25 MG/1
25 TABLET ORAL NIGHTLY
Qty: 90 TABLET | Refills: 3 | Status: SHIPPED | OUTPATIENT
Start: 2022-03-08 | End: 2022-12-05

## 2022-03-08 RX ORDER — TIMOLOL MALEATE 5 MG/ML
SOLUTION/ DROPS OPHTHALMIC
COMMUNITY
Start: 2022-02-11

## 2022-03-08 NOTE — PROGRESS NOTES
Date of Office Visit: 2022  Encounter Provider: MADISON Mathis  Place of Service: [facility] CARDIOLOGY  Patient Name: Kate Brambila  :1950  Primary Cardiologist: Dr. Sherri Robison     Chief Complaint   Patient presents with   • Coronary Artery Disease          • Follow-up       HPI: Kate Brambila is a 71 y.o. female who presents today for follow-up on PSVT and palpitations. I reviewed her medical records.    In , she had chest pain and palpitations. A cardiac PET scan was normal. Holter monitor showed normal sinus rhythm and short runs of nonsustained supraventricular tachycardia.  I symptoms a.m. n 2012, she reported chest pain and a stress echocardiogram was normal. In 2014, an echocardiogram showed an EF of 60%, grade 1 diastolic dysfunction, mild mitral insufficiency, mild to moderate tricuspid insufficiency, and no other abnormalities.  In May 2015, carotid duplex showed mild bilateral carotid plaque.    In 2019, she followed up in the office with Dr. Robison.  She reported intermittent chest pain at rest and dyspnea. Stress echocardiogram showed the following: No evidence of ischemia, EF 59%, grade 1 diastolic dysfunction, trace mitral valve regurgitation, mild to moderate tricuspid regurgitation, and mild pulmonary hypertension.      In 2020, she presented to the [facility] ED for mental status change and tremors after a fall had occurred.  CT scan and MRI of the brain did not show any acute abnormalities.  EEG showed some encephalopathy, but no seizure activity.  She was started on clonazepam by neurology and her symptoms could be from underlying stress and alcohol use.in 2021, carotid duplex showed less than 50% stenosis and patent vertebral arteries.    In 2021, high-resolution CT scan showed calcification of distal left main to proximal LAD and proximal LAD to mid LAD. In 2021,  cardiac PET scan was normal. She continued to experience chest discomfort and was started on isosorbide.     In November 2020, she underwent cardiac catheterization with the following results: Left main normal, LAD mid 30% stenosis, ramus intermedius normal, left circumflex 10% proximal stenosis, and RCA 30% proximal to mid stenosis. Risk factor modification medical management was recommended for nonobstructive coronary artery disease. Patient to follow-up with PCP for other etiologies of chest pain.     She presents today for follow-up visit.  She had an EGD and was told that something was wrong with her esophagus and her Prilosec was increased.  She denies any further chest pain.  Her blood pressure was elevated today on the first check and better on the second check.  At home her blood pressure is running 110-112/70s.  She denies chest pain, dyspnea, palpitations, edema, dizziness, syncope, or bleeding.  She is due for repeat lipid panel and would like to have that done at her PCP office.  She does not exercise, denies diabetes, does not smoke, and denies hypertension.    Past Medical History:   Diagnosis Date   • Acid reflux    • Asthma with allergic rhinitis    • Carotid artery stenosis, asymptomatic, bilateral 2015    mild per duplex 7/2021   • Cataract extraction status of left eye    • CMC arthritis 4/4/2018    Formatting of this note might be different from the original. Added automatically from request for surgery [id number]   • Colon cancer screening    • Coronary artery disease involving native coronary artery of native heart without angina pectoris 11/2021    nonobstructive per cardiac cath   • Depression with anxiety    • Elevated alkaline phosphatase measurement    • Esophageal reflux    • Glaucoma    • H/O echocardiogram 09/15/2014   • H/O supraventricular tachycardia     Short runs, nonsustained PSVT per Holter monitor 2008; remains on atenolol   • History of EKG 03/10/2015   • History of Holter  "monitoring 08/17/2012   • Hyperlipidemia    • Infection of thumb 11/22/2021   • Insomnia    • Loose stools     intermittent   • Low back pain radiating to left leg    • Lumbar stenosis with neurogenic claudication 9/18/2020    Formatting of this note might be different from the original. Added automatically from request for surgery [id number]   • Mitral valve insufficiency    • Nausea     chronic   • Nephrolithiasis    • Obesity    • Osteoarthritis    • Pneumonia    • PTSD (post-traumatic stress disorder) 7/9/2020   • Sciatica    • Sleep apnea     \"MILD\"  NO MACHINE   • Tricuspid valve insufficiency    • Trigger finger 2/6/2019    Formatting of this note might be different from the original. Added automatically from request for surgery [id number]   • Vitamin D insufficiency 7/14/2017       Past Surgical History:   Procedure Laterality Date   • APPENDECTOMY     • BACK SURGERY      x 3   • BLADDER SURGERY     • CARDIAC CATHETERIZATION N/A 11/17/2021    Procedure: Coronary angiography;  Surgeon: Akila Rodriguez MD;  Location:  [facility];  Service: Cardiovascular;  Laterality: N/A;   • CARDIAC CATHETERIZATION N/A 11/17/2021    Procedure: Left Heart Cath;  Surgeon: Akila Rodriguez MD;  Location:  [facility];  Service: Cardiovascular;  Laterality: N/A;   • CARDIAC CATHETERIZATION N/A 11/17/2021    Procedure: Left ventriculography;  Surgeon: Akila Rodriguez MD;  Location:  [facility];  Service: Cardiovascular;  Laterality: N/A;   • CARPAL TUNNEL RELEASE Right    • CATARACT EXTRACTION     • COLONOSCOPY  10/08/2015    Dr Al Razo   • FINGER/THUMB ARTHROPLASTY Left    • FINGER/THUMB ARTHROPLASTY Right    • HYSTERECTOMY     • REPLACEMENT TOTAL KNEE Right     x2   • ROTATOR CUFF REPAIR Bilateral     x 2 repair, 2 replacements    • THUMB CARPOMETACARPAL JOINT ARTHROPLASTY FLEXOR CARPI RADIALIS TENDON     • TOTAL SHOULDER ARTHROPLASTY Bilateral     with reversal   • UPPER " GASTROINTESTINAL ENDOSCOPY  10/08/2015    Dr Al Razo       Social History     Social History   • Marital status:      Spouse name: N/A   • Number of children: N/A   • Years of education: N/A     Occupational History   • Not on file.     Social History Main Topics   • Smoking status: Former Smoker     Types: Cigarettes     Quit date: 1993   • Smokeless tobacco: Never Used      Comment: caffeine use   • Alcohol use Yes      Comment: social use   • Drug use: No   • Sexual activity: Not on file       Family History   Problem Relation Age of Onset   • Aneurysm Mother         brain   • Hyperlipidemia Mother    • Hypertension Mother    • Cancer Mother         lung   • Alcohol abuse Father    • Other Father         hepatic failure   • Other Son         suicide   • Colon cancer Neg Hx    • Colon polyps Neg Hx    • Malig Hyperthermia Neg Hx        Review of Systems   Constitutional: Negative for chills, diaphoresis, fever, malaise/fatigue, night sweats, weight gain and weight loss.   HENT: Negative for hearing loss, nosebleeds, sore throat and tinnitus.    Eyes: Negative for blurred vision, double vision, pain and visual disturbance.   Cardiovascular: Negative for chest pain, claudication, cyanosis, dyspnea on exertion, irregular heartbeat, leg swelling, near-syncope, orthopnea, palpitations, paroxysmal nocturnal dyspnea and syncope.   Respiratory: Negative for cough, hemoptysis, shortness of breath, snoring and wheezing.    Endocrine: Negative for cold intolerance, heat intolerance and polyuria.   Hematologic/Lymphatic: Negative for bleeding problem. Does not bruise/bleed easily.   Skin: Negative for color change, dry skin, flushing and itching.   Musculoskeletal: Negative for falls, joint pain, joint swelling, muscle cramps, muscle weakness and myalgias.   Gastrointestinal: Positive for heartburn. Negative for abdominal pain, constipation, melena, nausea and vomiting.   Genitourinary: Negative for dysuria and  hematuria.   Neurological: Negative for excessive daytime sleepiness, dizziness, light-headedness, loss of balance, numbness, paresthesias, seizures and vertigo.   Psychiatric/Behavioral: Negative for altered mental status, depression, memory loss and substance abuse. The patient does not have insomnia and is not nervous/anxious.    Allergic/Immunologic: Negative for environmental allergies.       Allergies   Allergen Reactions   • Propofol Confusion   • Silver Other (See Comments)     Dressing only   • Gluten Meal Diarrhea     Pt is gluten intolerant          Current Outpatient Medications:   •  albuterol (PROAIR HFA) 108 (90 Base) MCG/ACT inhaler, Inhale 2 puffs Every 4 (Four) Hours As Needed for Wheezing., Disp: 8.5 g, Rfl: 6  •  Aspirin 325 MG capsule, Take  by mouth., Disp: , Rfl:   •  atenolol (TENORMIN) 25 MG tablet, Take 1 tablet by mouth Every Night., Disp: 90 tablet, Rfl: 3  •  atorvastatin (LIPITOR) 20 MG tablet, Take 1 tablet by mouth Every Night., Disp: 90 tablet, Rfl: 3  •  cyclobenzaprine (FLEXERIL) 10 MG tablet, Take 10 mg by mouth 3 (Three) Times a Day As Needed for Muscle Spasms., Disp: , Rfl:   •  HYDROcodone-acetaminophen (NORCO) 7.5-325 MG per tablet, Take  by mouth 3 (Three) Times a Day., Disp: , Rfl:   •  hydrOXYzine (ATARAX) 10 MG tablet, Take 10 mg by mouth 3 (Three) Times a Day., Disp: , Rfl:   •  nitroglycerin (NITROSTAT) 0.4 MG SL tablet, 1 under the tongue as needed for angina, may repeat q5mins for up three doses, Disp: 30 tablet, Rfl: 1  •  omeprazole (priLOSEC) 40 MG capsule, Take 40 mg by mouth Daily., Disp: , Rfl:   •  ondansetron (ZOFRAN) 4 MG tablet, Take 1 tablet by mouth Every 8 (Eight) Hours As Needed for Nausea or Vomiting., Disp: 20 tablet, Rfl: 0  •  sennosides-docusate (PERICOLACE) 8.6-50 MG per tablet, Take 1 tablet by mouth Daily., Disp: , Rfl:   •  timolol (TIMOPTIC) 0.5 % ophthalmic solution, INSTILL ONE DROP IN BOTH EYES EVERY MORNING, Disp: , Rfl:   •  traZODone  "(DESYREL) 100 MG tablet, take 2 tablets by mouth every NIGHT, Disp: 180 tablet, Rfl: 1  •  venlafaxine XR (EFFEXOR-XR) 150 MG 24 hr capsule, TAKE 1 CAPSULE BY MOUTH ONCE DAILY, Disp: 90 capsule, Rfl: 0  •  venlafaxine XR (EFFEXOR-XR) 75 MG 24 hr capsule, TAKE ONE CAPSULE BY MOUTH DAILY ALONG WITH 150MG CAPSULE, Disp: 90 capsule, Rfl: 0      Objective:     Vitals:    03/08/22 0759 03/08/22 0815 03/08/22 0832   BP: 130/80 136/86 108/78   BP Location: Left arm Right arm Left arm   Patient Position:   Sitting   Pulse: 64     Weight: 83.9 kg (185 lb)     Height: 170.2 cm (67\")       Body mass index is 28.98 kg/m².    PHYSICAL EXAM:    Vitals Reviewed.   General Appearance: No acute distress, well developed and well nourished.   Eyes: Conjunctiva and lids: No erythema, swelling, or discharge. Sclera non-icteric.   HENT: Atraumatic, normocephalic. External eyes, ears, and nose normal. No hearing loss noted. Mucous membranes normal. Lips not cyanotic. Neck supple with no tenderness.  Wearing a mask.  Respiratory: No signs of respiratory distress. Respiration rhythm and depth normal.   Clear to auscultation. No rales, crackles, rhonchi, or wheezing auscultated.   Cardiovascular:  Jugular Venous Pressure: Normal  Heart Rate and Rhythm: Normal, Heart Sounds: Normal S1 and S2. No S3 or S4 noted.  Murmurs: No murmurs noted. No rubs, thrills, or gallops.   Lower Extremities: No edema noted.  Gastrointestinal:  Abdomen soft, non-distended, non-tender. Normal bowel sounds. No hepatomegaly.   Musculoskeletal: Normal movement of extremities  Skin and Nails: General appearance normal. No pallor, cyanosis, diaphoresis. Skin temperature normal. No clubbing of fingernails.   Psychiatric: Patient alert and oriented to person, place, and time. Speech and behavior appropriate. Normal mood and affect.       ECG 12 Lead    Date/Time: 3/8/2022 8:04 AM  Performed by: Clara Lemos APRN  Authorized by: Clara Lemos APRN   Comparison: " compared with previous ECG from 9/1/2021  Similar to previous ECG  Rhythm: sinus rhythm  Rate: normal  BPM: 64  Conduction: conduction normal  QRS axis: normal  Other findings: non-specific ST-T wave changes    Clinical impression: non-specific ECG                Assessment:       Diagnosis Plan   1. Coronary artery disease involving native coronary artery of native heart without angina pectoris     2. Supraventricular tachycardia (HCC)     3. Nonrheumatic mitral valve regurgitation     4. Nonrheumatic tricuspid valve regurgitation     5. Mixed hyperlipidemia     6. Carotid artery stenosis, asymptomatic, bilateral            Plan:       1.  Coronary Artery Disease: Nonobstructive disease diagnosed per cardiac catheterization in November 2021.  Discussed risk factor modification.  I recommend an LDL less than 70 and she wants to have her lipids rechecked at her PCP office.  I also encouraged her to exercise 30 minutes daily.  Continue aspirin and atorvastatin.  She has no longer having chest pain.  I explained that we could do a trial without the isosorbide since she has nonobstructive CAD.  She has a history of an esophageal abnormality and heartburn.  The isosorbide might be helping with dilation of the esophagus and if she has recurrent chest pain she will need to follow-up with her GI doctor.    2. Supraventricular tachycardia: Noted per Holter monitor in 2008 to have nonsustained short runs of SVT.  Continue current dose of atenolol.  Denies palpitations.    3.  Tricuspid Valve Insufficiency: Mild to moderate per echocardiogram September 2014 and 7/2019.  Continue to monitor.    4.  Mitral Valve Insufficiency: Mild per echocardiogram September 2014 and trace per echo 7/2019.  I did not appreciate a heart murmur today.    5.  Hyperlipidemia: LDL goal less than 70 recommended.  She wants to have a repeat lipid panel completed at her PCP office.  If not at goal, I would increase atorvastatin to 40 mg daily.    6.   Bilateral Carotid Artery Stenosis: Mild per duplex in 2021.    7.  I recommended a 1 year follow-up visit with Dr. Robison.      As always, it has been a pleasure to participate in your patient's care.      Sincerely,         MADISON Hannon        · Dictated using Dragon  · COVID-19 Precautions - Patient was compliant in wearing a mask. When I saw the patient, I used appropriate personal protective equipment (PPE) including mask and eye shield (standard procedure).  Additionally, I used gown and gloves if indicated.  Hand hygiene was completed before and after seeing the patient.  I spent 33 minutes reviewing her medical records/testing/previous office notes/labs, face-to-face interaction with patient, physical examination, formulating the plan of care, and discussion of plan of care with patient.

## 2022-06-06 RX ORDER — ATORVASTATIN CALCIUM 20 MG/1
20 TABLET, FILM COATED ORAL NIGHTLY
Qty: 90 TABLET | Refills: 0 | Status: SHIPPED | OUTPATIENT
Start: 2022-06-06 | End: 2022-09-13 | Stop reason: SDUPTHER

## 2022-09-13 RX ORDER — ATORVASTATIN CALCIUM 20 MG/1
20 TABLET, FILM COATED ORAL NIGHTLY
Qty: 90 TABLET | Refills: 3 | Status: SHIPPED | OUTPATIENT
Start: 2022-09-13

## 2022-10-20 NOTE — TELEPHONE ENCOUNTER
Attempted to contact patient. Voicemail left requesting a call back for the results. Will continue to try and reach patient.      Adamaris Ennis RN  [facility] [place]       Cantharidin Counseling: Calcipotriene Counseling:  I discussed with the patient the risks of calcipotriene including but not limited to erythema, scaling, itching, and irritation.

## 2022-12-05 RX ORDER — ATENOLOL 25 MG/1
TABLET ORAL
Qty: 90 TABLET | Refills: 3 | Status: SHIPPED | OUTPATIENT
Start: 2022-12-05

## 2023-09-01 RX ORDER — ATENOLOL 25 MG/1
TABLET ORAL
Qty: 90 TABLET | Refills: 3 | Status: SHIPPED | OUTPATIENT
Start: 2023-09-01

## 2023-12-05 ENCOUNTER — OFFICE VISIT (OUTPATIENT)
Dept: CARDIOLOGY | Facility: CLINIC | Age: 73
End: 2023-12-05
Payer: MEDICARE

## 2023-12-05 VITALS
WEIGHT: 172 LBS | HEART RATE: 80 BPM | BODY MASS INDEX: 27 KG/M2 | DIASTOLIC BLOOD PRESSURE: 78 MMHG | HEIGHT: 67 IN | SYSTOLIC BLOOD PRESSURE: 122 MMHG

## 2023-12-05 DIAGNOSIS — E78.2 MIXED HYPERLIPIDEMIA: ICD-10-CM

## 2023-12-05 DIAGNOSIS — I34.0 NONRHEUMATIC MITRAL VALVE REGURGITATION: ICD-10-CM

## 2023-12-05 DIAGNOSIS — I25.10 CORONARY ARTERY DISEASE INVOLVING NATIVE CORONARY ARTERY OF NATIVE HEART WITHOUT ANGINA PECTORIS: Primary | ICD-10-CM

## 2023-12-05 PROCEDURE — 99214 OFFICE O/P EST MOD 30 MIN: CPT | Performed by: INTERNAL MEDICINE

## 2023-12-05 PROCEDURE — 1159F MED LIST DOCD IN RCRD: CPT | Performed by: INTERNAL MEDICINE

## 2023-12-05 PROCEDURE — 1160F RVW MEDS BY RX/DR IN RCRD: CPT | Performed by: INTERNAL MEDICINE

## 2023-12-05 PROCEDURE — 93000 ELECTROCARDIOGRAM COMPLETE: CPT | Performed by: INTERNAL MEDICINE

## 2023-12-05 RX ORDER — ATORVASTATIN CALCIUM 40 MG/1
40 TABLET, FILM COATED ORAL DAILY
COMMUNITY
Start: 2023-05-12 | End: 2024-05-11

## 2023-12-05 RX ORDER — LATANOPROST 50 UG/ML
1 SOLUTION/ DROPS OPHTHALMIC NIGHTLY
COMMUNITY
Start: 2023-09-11

## 2023-12-05 RX ORDER — BUPRENORPHINE HYDROCHLORIDE 450 UG/1
1 FILM, SOLUBLE BUCCAL 2 TIMES DAILY
COMMUNITY

## 2023-12-05 NOTE — PROGRESS NOTES
Date of Office Visit: 2023  Encounter Provider: Sherri Robison MD  Place of Service: [facility] CARDIOLOGY  Patient Name: Kate Brambila  :1950      Patient ID:  Kate Brambila is a 73 y.o. female is here for  followup for         History of Present Illness    She had a PET stress study done in  because of some chest pain and had a Holter at  that time for palpitations. This did not show any significant abnormality other than  short runs of nonsustained supraventricular tachycardia. She has known asthma and follows  with Dr. Crowe. She had a severe bronchial infection in 2011 and wound up on a  ventilator for quite some time.       I saw her on 2012, in the chest pain unit. She came in with chest discomfort  which sounded anginal in nature and had a stress echocardiogram done on 2012,  showing no evidence of ischemia, ejection fraction of 64%, grade 1 diastolic dysfunction,  mild mitral insufficiency, and moderate tricuspid insufficiency.     She did have a 2-D echocardiogram  with Doppler done in 2014 that showed ejection fraction of 60%, grade 1 diastolic  dysfunction, mild mitral insufficiency, mild to moderate tricuspid insufficiency, right  ventricular systolic pressure of 24 mmHg, and no pericardial effusion.      She did have a carotid duplex study performed in 2015 which revealed mild bilateral carotid plaquing.        She has had 2 nephews with proximal left anterior descending artery stenosis requiring stenting.     She has stress echocardiogram done for chest pain 2019 showing resting ejection fraction 59%, normal stress echocardiogram with post exercise ejection fraction of 70%.  This also showed mild to moderate tricuspid insufficiency with grade 1 diastolic dysfunction.  She has had a number of tests at [facility].    She had back surgery 2020 and then developed an infection afterwards.  This  took a while to recover from.  She had an echocardiogram done for bacteremia on 10/15/2020 showed ejection fraction of 60-65% with mild tricuspid insufficiency no evidence of valvular vegetations.  They then decided to do a back stimulator but she developed a severe rash and Tegaderm and that had to be delayed.  The rash happened February 2021.  She was able to stimulator March 2021.  She then fell June 2021 and hit her head.  She did not lose consciousness but it was a severe injury.  She is slightly dizzy and feels like she leans towards the left since that head injury.  She had a high-resolution CT of the chest done 7/15/2021 showing mild subpleural reticulation in the posterior medial lower lobes bilaterally characteristic of chronic interstitial lung disease, small airway air trapping noted and diffuse hepatic steatosis.  She had bilateral carotid duplexes in 6/20/2021 showing less than 50% bilateral carotid artery stenosis and patent vertebral arteries.  She will be seeing Dr. Allen Hines from pulmonary at [facility].     Stress study done 9/2021 showed no evidence of ischemia.  Cardiac catheterization done 11/17/2021 showed normal left ventricular systolic function, mild mid LAD stenosis 30%, 10% proximal circumflex stenosis, 30% proximal to mid RCA stenosis, medical treatment was recommended.  Labs in 7/14/2023 show normal BMP, normal CBC.  Lipids done 5/9/2023 showed triglycerides 370, total close 177, HDL 42, LDL 61, VLDL 74, normal TSH.    She has occasional fleeting nonexertional chest pain which she thinks is due to reflux.  She does not feel heart racing or skipping and she had no dizziness, syncope or falls.  She has no orthopnea or PND.  She has no fevers or chills.  She may be taking methadone for pain management-we talked about QT interval measurement when she starts this.    Past Medical History:   Diagnosis Date    Acid reflux     Asthma with allergic rhinitis     Carotid artery stenosis,  "asymptomatic, bilateral 2015    mild per duplex 7/2021    Cataract extraction status of left eye     CMC arthritis 4/4/2018    Formatting of this note might be different from the original. Added automatically from request for surgery [id number]    Colon cancer screening     Coronary artery disease involving native coronary artery of native heart without angina pectoris 11/2021    nonobstructive per cardiac cath    Depression with anxiety     Elevated alkaline phosphatase measurement     Esophageal reflux     Glaucoma     H/O echocardiogram 09/15/2014    H/O supraventricular tachycardia     Short runs, nonsustained PSVT per Holter monitor 2008; remains on atenolol    History of EKG 03/10/2015    History of Holter monitoring 08/17/2012    Hyperlipidemia     Infection of thumb 11/22/2021    Insomnia     Loose stools     intermittent    Low back pain radiating to left leg     Lumbar stenosis with neurogenic claudication 9/18/2020    Formatting of this note might be different from the original. Added automatically from request for surgery [id number]    Mitral valve insufficiency     Nausea     chronic    Nephrolithiasis     Obesity     Osteoarthritis     Pneumonia     PTSD (post-traumatic stress disorder) 7/9/2020    Sciatica     Sleep apnea     \"MILD\"  NO MACHINE    Tricuspid valve insufficiency     Trigger finger 2/6/2019    Formatting of this note might be different from the original. Added automatically from request for surgery [id number]    Vitamin D insufficiency 7/14/2017         Past Surgical History:   Procedure Laterality Date    APPENDECTOMY      BACK SURGERY      x 3    BLADDER SURGERY      CARDIAC CATHETERIZATION N/A 11/17/2021    Procedure: Coronary angiography;  Surgeon: Akila Rodriguez MD;  Location:  [facility] CATH INVASIVE LOCATION;  Service: Cardiovascular;  Laterality: N/A;    CARDIAC CATHETERIZATION N/A 11/17/2021    Procedure: Left Heart Cath;  Surgeon: Akila Rodriguez MD;  Location: [facility] CATH INVASIVE " LOCATION;  Service: Cardiovascular;  Laterality: N/A;    CARDIAC CATHETERIZATION N/A 11/17/2021    Procedure: Left ventriculography;  Surgeon: Akila Rodriguez MD;  Location: [facility] INVASIVE LOCATION;  Service: Cardiovascular;  Laterality: N/A;    CARPAL TUNNEL RELEASE Right     CATARACT EXTRACTION      COLONOSCOPY  10/08/2015    Dr Al Razo    FINGER/THUMB ARTHROPLASTY Left     FINGER/THUMB ARTHROPLASTY Right     HYSTERECTOMY      REPLACEMENT TOTAL KNEE Right     x2    ROTATOR CUFF REPAIR Bilateral     x 2 repair, 2 replacements     THUMB CARPOMETACARPAL JOINT ARTHROPLASTY FLEXOR CARPI RADIALIS TENDON      TOTAL SHOULDER ARTHROPLASTY Bilateral     with reversal    UPPER GASTROINTESTINAL ENDOSCOPY  10/08/2015    Dr Al Razo       Current Outpatient Medications on File Prior to Visit   Medication Sig Dispense Refill    albuterol (PROAIR HFA) 108 (90 Base) MCG/ACT inhaler Inhale 2 puffs Every 4 (Four) Hours As Needed for Wheezing. 8.5 g 6    atenolol (TENORMIN) 25 MG tablet TAKE 1 TABLET BY MOUTH AT NIGHT 90 tablet 3    atorvastatin (LIPITOR) 40 MG tablet Take 1 tablet by mouth Daily.      cyclobenzaprine (FLEXERIL) 10 MG tablet Take 1 tablet by mouth 3 (Three) Times a Day As Needed for Muscle Spasms.      hydrOXYzine (ATARAX) 10 MG tablet Take 1 tablet by mouth 3 (Three) Times a Day.      latanoprost (XALATAN) 0.005 % ophthalmic solution 1 drop Every Night.      nitroglycerin (NITROSTAT) 0.4 MG SL tablet 1 under the tongue as needed for angina, may repeat q5mins for up three doses 30 tablet 1    omeprazole (priLOSEC) 40 MG capsule Take 1 capsule by mouth Daily.      ondansetron (ZOFRAN) 4 MG tablet Take 1 tablet by mouth Every 8 (Eight) Hours As Needed for Nausea or Vomiting. 20 tablet 0    sennosides-docusate (PERICOLACE) 8.6-50 MG per tablet Take 1 tablet by mouth Daily.      timolol (TIMOPTIC) 0.5 % ophthalmic solution INSTILL ONE DROP IN BOTH EYES EVERY MORNING      venlafaxine XR (EFFEXOR-XR) 150  "MG 24 hr capsule TAKE 1 CAPSULE BY MOUTH ONCE DAILY 90 capsule 0    Aspirin 325 MG capsule Take  by mouth. (Patient not taking: Reported on 2023)      Belbuca 450 MCG film Take 1 film by mouth 2 (Two) Times a Day.      HYDROcodone-acetaminophen (NORCO) 7.5-325 MG per tablet Take  by mouth 3 (Three) Times a Day.      traZODone (DESYREL) 100 MG tablet take 2 tablets by mouth every NIGHT 180 tablet 1    venlafaxine XR (EFFEXOR-XR) 75 MG 24 hr capsule TAKE ONE CAPSULE BY MOUTH DAILY ALONG WITH 150MG CAPSULE 90 capsule 0    [DISCONTINUED] atorvastatin (LIPITOR) 20 MG tablet Take 1 tablet by mouth Every Night. 90 tablet 3     No current facility-administered medications on file prior to visit.       Social History     Socioeconomic History    Marital status:    Tobacco Use    Smoking status: Former     Packs/day: 2.00     Years: 25.00     Additional pack years: 0.00     Total pack years: 50.00     Types: Cigarettes     Quit date:      Years since quittin.9     Passive exposure: Past    Smokeless tobacco: Never   Substance and Sexual Activity    Alcohol use: Yes     Alcohol/week: 2.0 standard drinks of alcohol     Types: 2 Shots of liquor per week     Comment: social use    Drug use: No             Procedures    ECG 12 Lead    Date/Time: 2023 10:52 AM  Performed by: Sherri Robison MD    Authorized by: Sherri Robison MD  Comparison: compared with previous ECG   Similar to previous ECG  Rhythm: sinus rhythm  Other findings: non-specific ST-T wave changes              Objective:      Vitals:    23 0948   BP: 122/78   Pulse: 80   Weight: 78 kg (172 lb)   Height: 170.2 cm (67\")     Body mass index is 26.94 kg/m².    Vitals reviewed.   Constitutional:       General: Not in acute distress.     Appearance: Not diaphoretic.   Neck:      Vascular: No carotid bruit or JVD.   Pulmonary:      Effort: Pulmonary effort is normal.      Breath sounds: Normal breath sounds.   Cardiovascular:     "  Normal rate. Regular rhythm.      Murmurs: There is no murmur.      No gallop.  No rub.   Pulses:     Intact distal pulses.      Carotid: 2+ bilaterally.     Radial: 2+ bilaterally.     Dorsalis pedis: 2+ bilaterally.     Posterior tibial: 2+ bilaterally.  Edema:     Peripheral edema absent.   Neurological:      Cranial Nerves: No cranial nerve deficit.         Lab Review:       Assessment:      Diagnosis Plan   1. Coronary artery disease involving native coronary artery of native heart without angina pectoris        2. Nonrheumatic mitral valve regurgitation        3. Mixed hyperlipidemia            1.  Mild nonobstructive CAD   2. Mild -moderate tricuspid insufficiency.   3. Mild mitral insufficiency.  4. Hyperlipidemia, treated.  5. History of palpitations, on atenolol.  6. History of nonsustained supraventricular tachycardia.  Stable  7.  Severe back problems, status post back surgery, now has a stimulator for her back.     Plan:       Start aspirin 81 mg daily, okay to take methadone but will check ECG 2 to 4 weeks after starting this.  No other testing at this time, see Mery in 1 year.

## 2024-01-11 ENCOUNTER — CLINICAL SUPPORT (OUTPATIENT)
Dept: CARDIOLOGY | Facility: CLINIC | Age: 74
End: 2024-01-11
Payer: MEDICARE

## 2024-01-11 DIAGNOSIS — I25.10 CORONARY ARTERY DISEASE INVOLVING NATIVE CORONARY ARTERY OF NATIVE HEART WITHOUT ANGINA PECTORIS: Primary | ICD-10-CM

## 2024-01-11 PROCEDURE — 93000 ELECTROCARDIOGRAM COMPLETE: CPT | Performed by: INTERNAL MEDICINE

## 2024-01-11 NOTE — PROGRESS NOTES
Procedure     ECG 12 Lead    Date/Time: 1/11/2024 9:33 AM  Performed by: Sherri Robison MD    Authorized by: Sherri Robison MD  Comparison: compared with previous ECG   Similar to previous ECG  Rhythm: sinus rhythm    Clinical impression: normal ECG

## 2024-03-21 ENCOUNTER — APPOINTMENT (OUTPATIENT)
Dept: GENERAL RADIOLOGY | Age: 74
End: 2024-03-21

## 2024-03-21 ENCOUNTER — APPOINTMENT (OUTPATIENT)
Dept: CT IMAGING | Age: 74
End: 2024-03-21

## 2024-03-21 ENCOUNTER — HOSPITAL ENCOUNTER (EMERGENCY)
Age: 74
Discharge: HOME | End: 2024-03-21
Attending: EMERGENCY MEDICINE

## 2024-03-21 VITALS
RESPIRATION RATE: 18 BRPM | DIASTOLIC BLOOD PRESSURE: 71 MMHG | OXYGEN SATURATION: 98 % | TEMPERATURE: 97.8 F | SYSTOLIC BLOOD PRESSURE: 120 MMHG | HEART RATE: 74 BPM

## 2024-03-21 DIAGNOSIS — S42.001A CLOSED NONDISPLACED FRACTURE OF RIGHT CLAVICLE, UNSPECIFIED PART OF CLAVICLE, INITIAL ENCOUNTER: Primary | ICD-10-CM

## 2024-03-21 PROCEDURE — 71045 X-RAY EXAM CHEST 1 VIEW: CPT

## 2024-03-21 PROCEDURE — 10002800 HB RX 250 W HCPCS

## 2024-03-21 PROCEDURE — 10004651 HB RX, NO CHARGE ITEM

## 2024-03-21 PROCEDURE — 96375 TX/PRO/DX INJ NEW DRUG ADDON: CPT

## 2024-03-21 PROCEDURE — 96374 THER/PROPH/DIAG INJ IV PUSH: CPT

## 2024-03-21 PROCEDURE — 73030 X-RAY EXAM OF SHOULDER: CPT

## 2024-03-21 PROCEDURE — 73080 X-RAY EXAM OF ELBOW: CPT

## 2024-03-21 PROCEDURE — 70450 CT HEAD/BRAIN W/O DYE: CPT

## 2024-03-21 PROCEDURE — 10002803 HB RX 637: Performed by: EMERGENCY MEDICINE

## 2024-03-21 PROCEDURE — 99284 EMERGENCY DEPT VISIT MOD MDM: CPT

## 2024-03-21 PROCEDURE — 72125 CT NECK SPINE W/O DYE: CPT

## 2024-03-21 PROCEDURE — 10002801 HB RX 250 W/O HCPCS

## 2024-03-21 RX ORDER — IBUPROFEN 800 MG/1
800 TABLET ORAL EVERY 8 HOURS PRN
Qty: 90 TABLET | Refills: 1 | Status: SHIPPED | OUTPATIENT
Start: 2024-03-21

## 2024-03-21 RX ORDER — HYDROCODONE BITARTRATE AND ACETAMINOPHEN 5; 325 MG/1; MG/1
1-2 TABLET ORAL EVERY 6 HOURS PRN
Qty: 12 TABLET | Refills: 0 | Status: SHIPPED | OUTPATIENT
Start: 2024-03-21 | End: 2024-03-24

## 2024-03-21 RX ORDER — ACETAMINOPHEN 500 MG
1000 TABLET ORAL ONCE
Status: COMPLETED | OUTPATIENT
Start: 2024-03-21 | End: 2024-03-21

## 2024-03-21 RX ORDER — FAMOTIDINE 20 MG/1
50 TABLET, FILM COATED ORAL ONCE
Status: COMPLETED | OUTPATIENT
Start: 2024-03-21 | End: 2024-03-21

## 2024-03-21 RX ORDER — LIDOCAINE 4 G/G
1 PATCH TOPICAL EVERY 24 HOURS
Qty: 10 PATCH | Refills: 0 | Status: SHIPPED | OUTPATIENT
Start: 2024-03-21

## 2024-03-21 RX ORDER — DIPHENHYDRAMINE HCL 25 MG
50 CAPSULE ORAL ONCE
Status: COMPLETED | OUTPATIENT
Start: 2024-03-21 | End: 2024-03-21

## 2024-03-21 RX ADMIN — FENTANYL CITRATE 50 MCG: 50 INJECTION INTRAMUSCULAR; INTRAVENOUS at 07:51

## 2024-03-21 RX ADMIN — DIPHENHYDRAMINE HYDROCHLORIDE 50 MG: 25 CAPSULE ORAL at 10:05

## 2024-03-21 RX ADMIN — FAMOTIDINE 50 MG: 20 TABLET ORAL at 10:05

## 2024-03-21 RX ADMIN — ACETAMINOPHEN 1000 MG: 500 TABLET ORAL at 09:46

## 2024-03-21 RX ADMIN — MORPHINE SULFATE 3 MG: 4 INJECTION INTRAVENOUS at 09:46

## 2024-03-21 ASSESSMENT — ENCOUNTER SYMPTOMS
VOMITING: 0
POLYPHAGIA: 0
AGITATION: 0
CHILLS: 0
DIZZINESS: 0
DIARRHEA: 0
NERVOUS/ANXIOUS: 0
NAUSEA: 0
FEVER: 0
SORE THROAT: 0
EYE DISCHARGE: 0
SHORTNESS OF BREATH: 0
LIGHT-HEADEDNESS: 0
BRUISES/BLEEDS EASILY: 0
BACK PAIN: 0
PHOTOPHOBIA: 0
CHEST TIGHTNESS: 0

## 2024-03-21 ASSESSMENT — PAIN SCALES - GENERAL
PAINLEVEL_OUTOF10: 10
PAINLEVEL_OUTOF10: 7
PAINLEVEL_OUTOF10: 10
PAINLEVEL_OUTOF10: 5

## 2024-05-28 RX ORDER — ATORVASTATIN CALCIUM 20 MG/1
20 TABLET, FILM COATED ORAL
Qty: 90 TABLET | Refills: 3 | OUTPATIENT
Start: 2024-05-28

## 2024-06-17 ENCOUNTER — TELEPHONE (OUTPATIENT)
Dept: NEUROSURGERY | Facility: CLINIC | Age: 74
End: 2024-06-17
Payer: MEDICARE

## 2024-06-17 NOTE — TELEPHONE ENCOUNTER
Patient's  came into the office to check on referral status to see Dr. HERNANDEZ. I advised that according to the latest communication, there was nothing that Dr. HERNANDEZ could do for her from a surgical standpoint, therefore referral is closed. Patient's  insists that she be able to see Dr. HERNANDEZ anyway when he returns and he would like the referral reopened, because he stated that his wife's back has gotten worse. Please call patient to advise of next steps.    *Checked with referral coordinator on this while patient's  was at the office, Crystal suggested that I send a message to you to look into this further, thanks for your help!

## 2024-06-18 NOTE — TELEPHONE ENCOUNTER
Renae,     Dr. Castillo has already reviewed the chart and stated this is not surgical and there is nothing that he can offer this patient. Pt will need to speak with her pcp about treatment such as physical therapy or pain management.  It is unfortunate news, but it would waste the patient's money and time waiting to get in here just to be told this in person.    Thank you.

## 2024-08-15 RX ORDER — ATENOLOL 25 MG/1
TABLET ORAL
Qty: 90 TABLET | Refills: 3 | Status: SHIPPED | OUTPATIENT
Start: 2024-08-15

## 2024-09-26 ENCOUNTER — DOCUMENTATION (OUTPATIENT)
Dept: CARDIOLOGY | Facility: CLINIC | Age: 74
End: 2024-09-26
Payer: MEDICARE

## 2024-09-26 ENCOUNTER — TELEPHONE (OUTPATIENT)
Dept: CARDIOLOGY | Facility: CLINIC | Age: 74
End: 2024-09-26
Payer: MEDICARE

## 2024-12-23 RX ORDER — ATORVASTATIN CALCIUM 20 MG/1
20 TABLET, FILM COATED ORAL
Qty: 90 TABLET | Refills: 3 | Status: SHIPPED | OUTPATIENT
Start: 2024-12-23

## 2024-12-26 ENCOUNTER — OFFICE VISIT (OUTPATIENT)
Dept: CARDIOLOGY | Facility: CLINIC | Age: 74
End: 2024-12-26
Payer: MEDICARE

## 2024-12-26 VITALS
SYSTOLIC BLOOD PRESSURE: 115 MMHG | HEIGHT: 67 IN | WEIGHT: 163 LBS | HEART RATE: 63 BPM | BODY MASS INDEX: 25.58 KG/M2 | DIASTOLIC BLOOD PRESSURE: 80 MMHG

## 2024-12-26 DIAGNOSIS — I38 VHD (VALVULAR HEART DISEASE): ICD-10-CM

## 2024-12-26 DIAGNOSIS — E78.2 MIXED HYPERLIPIDEMIA: ICD-10-CM

## 2024-12-26 DIAGNOSIS — I25.10 CORONARY ARTERY DISEASE INVOLVING NATIVE CORONARY ARTERY OF NATIVE HEART WITHOUT ANGINA PECTORIS: Primary | ICD-10-CM

## 2024-12-26 PROCEDURE — 99214 OFFICE O/P EST MOD 30 MIN: CPT | Performed by: FAMILY MEDICINE

## 2024-12-26 PROCEDURE — 93000 ELECTROCARDIOGRAM COMPLETE: CPT | Performed by: FAMILY MEDICINE

## 2024-12-26 RX ORDER — ASPIRIN 81 MG/1
81 TABLET ORAL DAILY
Qty: 90 TABLET | Refills: 3 | Status: SHIPPED | OUTPATIENT
Start: 2024-12-26

## 2024-12-26 RX ORDER — METHADONE HYDROCHLORIDE 5 MG/1
5 TABLET ORAL
COMMUNITY

## 2024-12-26 NOTE — PROGRESS NOTES
Date of Office Visit: 2024  Encounter Provider: MADISON Dang  Place of Service: [facility] CARDIOLOGY  Established cardiologist: Sherri Robison MD  Patient Name: Kate Brambila  :1950      Patient ID:  Kate Brambila is a 74 y.o. female is here for  followup    With a pertinent medical history of status post stimulator for severe back pain, NSVT and palpitations, hyperlipidemia, CAD, mitral and tricuspid insufficiency.   She has had 2 nephews with proximal left anterior descending artery stenosis requiring stenting.     History of Present Illness  PET stress  due to chest pain was nonischemic.  Holter done showed short runs of NSVT.    History of asthma and severe bronchial infection in  and was on a ventilator for that.  Dr. Crowe     she was seen in chest pain unit and a stress echo was nonischemic with an ejection fraction of 64%, G1 DD, mild mitral insufficiency, and moderate tricuspid insufficiency    2D echo  ejection fraction 60%, G1 DD, mild mitral insufficiency, mild to moderate tricuspid insufficiency, RVSP 24 mmHg    Duplex  revealed mild bilateral carotid plaquing.    echocardiogram done for chest pain in  showed a resting ejection fraction of 59%, normal stress echo with postexercise EF of 70%.  Patient mild to moderate tricuspid insufficiency, G1 DD.     had back surgery and developed postoperative infection.  An echocardiogram done for bacteremia showed an ejection fraction of 60 to 65%, mild tricuspid insufficiency, no evidence of valvular vegetations.    She had a back stimulator placed 2021.  Suffered a fall where she hit her head in 2021.  CT chest 2021 with findings characteristic of chronic ILD, diffuse hepatic steatosis.  Bilateral carotid duplex in  showed less than 50% bilateral carotid artery stenosis and patent vertebral arteries.    Stress study 2021 with no evidence  of ischemia.  A cardiac catheterization done November 2021 with normal LV gram, mild LAD stenosis of 30% in the midportion, 10% proximal circumflex stenosis, 30% proximal to mid RCA stenosis, medical management was recommended.    December 5, 2023 she saw Dr. Robison in the office.  She was started on 81 mg of aspirin daily.  They had discussed patient's potential plan to begin methadone for pain and that she would need an ECG done a few weeks after initiation for QT interval measurement.     Today Kate is doing well. She does not have any complaints or concerns today. She had a fall around March walking a runway off of a plane and fell and broke her collarbone.  And then later this year she fell at home and injured her back again.  She did not require any surgeries for these.  And she denies that falls were due to any lightheadedness, dizziness, presyncope/syncope.  The back stimulator is still in place but it is not active.  She is following with pain medicine and using methadone for her chronic back pain and she will need her EKG sent to the pain medicine office today.  She never has any chest pain or pressure, shortness of breath, CABEZAS, PND, heart racing, palpitations, or leg swelling.    Current Outpatient Medications on File Prior to Visit   Medication Sig Dispense Refill    albuterol (PROAIR HFA) 108 (90 Base) MCG/ACT inhaler Inhale 2 puffs Every 4 (Four) Hours As Needed for Wheezing. 8.5 g 6    atenolol (TENORMIN) 25 MG tablet TAKE 1 TABLET BY MOUTH AT NIGHT 90 tablet 3    atorvastatin (LIPITOR) 20 MG tablet TAKE 1 TABLET BY MOUTH AT NIGHT 90 tablet 3    hydrOXYzine (ATARAX) 10 MG tablet Take 1 tablet by mouth 3 (Three) Times a Day.      latanoprost (XALATAN) 0.005 % ophthalmic solution 1 drop Every Night.      methadone (DOLOPHINE) 5 MG tablet Take 1 tablet by mouth 5 (Five) Times a Day.      omeprazole (priLOSEC) 40 MG capsule Take 1 capsule by mouth Daily.      ondansetron (ZOFRAN) 4 MG tablet Take 1  "tablet by mouth Every 8 (Eight) Hours As Needed for Nausea or Vomiting. 20 tablet 0    timolol (TIMOPTIC) 0.5 % ophthalmic solution INSTILL ONE DROP IN BOTH EYES EVERY MORNING      tiZANidine (ZANAFLEX) 4 MG tablet Take 1 tablet by mouth 3 times a day.      venlafaxine XR (EFFEXOR-XR) 150 MG 24 hr capsule TAKE 1 CAPSULE BY MOUTH ONCE DAILY 90 capsule 0    nitroglycerin (NITROSTAT) 0.4 MG SL tablet 1 under the tongue as needed for angina, may repeat q5mins for up three doses 30 tablet 1    [DISCONTINUED] Belbuca 450 MCG film Take 1 film by mouth 2 (Two) Times a Day.      [DISCONTINUED] cyclobenzaprine (FLEXERIL) 10 MG tablet Take 1 tablet by mouth 3 (Three) Times a Day As Needed for Muscle Spasms.      [DISCONTINUED] sennosides-docusate (PERICOLACE) 8.6-50 MG per tablet Take 1 tablet by mouth Daily.       No current facility-administered medications on file prior to visit.         Procedures    ECG 12 Lead    Date/Time: 12/26/2024 9:04 AM  Performed by: Karen Lund APRN    Authorized by: Karen Lund APRN  Comparison: compared with previous ECG from 1/11/2024  Rhythm: sinus rhythm  BPM: 63  ST Flattening: III, aVF, V1, V2 and V3  QRS axis: normal  Comments:   Qtc 438              Objective:      Vitals:    12/26/24 0841   BP: 115/80   Pulse: 63   Weight: 73.9 kg (163 lb)   Height: 170.2 cm (67\")     Body mass index is 25.53 kg/m².  Wt Readings from Last 3 Encounters:   12/26/24 73.9 kg (163 lb)   12/05/23 78 kg (172 lb)   03/08/22 83.9 kg (185 lb)         Constitutional:       Comments: Kate is a 74-year-old slender  female who is in no acute distress, ambulating with a cane today   Eyes:      Pupils: Pupils are equal, round, and reactive to light.   Neck:      Vascular: No JVD.   Pulmonary:      Effort: Pulmonary effort is normal.      Breath sounds: Normal breath sounds.   Cardiovascular:      Normal rate. Regular rhythm.      Murmurs: There is no murmur.   Pulses:     Radial: 2+ " bilaterally.     Dorsalis pedis: 2+ bilaterally.     Posterior tibial: 2+ bilaterally.  Edema:     Peripheral edema absent.   Skin:     General: Skin is warm and dry.   Neurological:      Mental Status: Alert, oriented to person, place, and time and oriented to person, place and time.   Psychiatric:         Mood and Affect: Mood and affect normal.       Lab Review:      Lab Results   Component Value Date    TSH 1.330 12/29/2021     Lipid panel done 9/3/2024 with a total cholesterol 178, , HDL 43, LDL 89    Lab Results   Component Value Date    CHLPL 194 06/03/2020    CHLPL 174 05/17/2019    CHLPL 174 03/27/2018     Lab Results   Component Value Date    TRIG 163 (H) 06/03/2020    TRIG 91 05/17/2019    TRIG 170 (H) 03/27/2018     Lab Results   Component Value Date    HDL 40 (L) 06/03/2020    HDL 54 05/17/2019    HDL 49 03/27/2018     Lab Results   Component Value Date     (H) 06/03/2020     (H) 05/17/2019    LDL 91 03/27/2018       Lab Results   Component Value Date    WBC 8.49 09/03/2024    HGB 13 09/03/2024    HCT 40.9 09/03/2024    MCV 89.9 09/03/2024     09/03/2024       Lab Results   Component Value Date    GLUCOSE 94 11/16/2021    BUN 13 04/25/2022    CREATININE 0.89 04/25/2022    BCR 14.4 04/25/2022    K 4.3 04/25/2022    CO2 23 04/25/2022    CALCIUM 9.1 04/25/2022    PROTENTOTREF 6.7 05/17/2019    ALBUMIN 3.9 04/25/2022    BILITOT 0.2 04/25/2022    AST 41 (H) 04/25/2022    ALT 31 04/25/2022       Lab Results   Component Value Date    HGBA1C 5.8 (H) 09/03/2024       Assessment:     1. Coronary artery disease involving native coronary artery of native heart without angina pectoris    2. VHD (valvular heart disease)    3. Mixed hyperlipidemia      1.  Mild nonobstructive CAD.  Daily 81 mg aspirin, atorvastatin. there is no angina.   2. Mild -moderate tricuspid insufficiency.   3. Mild mitral insufficiency.  4. Hyperlipidemia, treated.  5. History of palpitations, on atenolol. Does not  have these now.   6. History of nonsustained supraventricular tachycardia.  Stable  7.  Severe back problems, status post back surgeries, and a stimulator which is not active.  Now on methadone for her back pain.  Her QT interval is stable on ECG.    Plan:   No medication changes were made  From a cardiac standpoint she is very stable and can see Dr. Robison again in 1 year.  ECG sent to pain medicine office today:  Aravind Calderon MD  [address] [place] [address]  Main Office Phone: [phone]  Fax: [phone]      Return in about 1 year (around 12/26/2025) for Dr. Robison.        Thank you for allowing me to participate in this patient's care. Please call with any questions or concerns.          Dragon dictation device was utilized in this note.

## 2025-06-23 RX ORDER — ATENOLOL 25 MG/1
25 TABLET ORAL
Qty: 90 TABLET | Refills: 1 | Status: SHIPPED | OUTPATIENT
Start: 2025-06-23

## (undated) DEVICE — PK CATH CARD 40

## (undated) DEVICE — GLIDESHEATH SLENDER STAINLESS STEEL KIT: Brand: GLIDESHEATH SLENDER

## (undated) DEVICE — KT MANIFLD CARDIAC

## (undated) DEVICE — GW EMR FIX EXCHG J STD .035 3MM 260CM

## (undated) DEVICE — CATH DIAG IMPULSE FL3.5 5F 100CM

## (undated) DEVICE — CATH DIAG IMPULSE FR4 5F 100CM

## (undated) DEVICE — CATH VENT MIV RADL PIG ST TIP 5F 110CM